# Patient Record
Sex: MALE | Race: OTHER | HISPANIC OR LATINO
[De-identification: names, ages, dates, MRNs, and addresses within clinical notes are randomized per-mention and may not be internally consistent; named-entity substitution may affect disease eponyms.]

---

## 2018-05-27 PROBLEM — Z00.00 ENCOUNTER FOR PREVENTIVE HEALTH EXAMINATION: Status: ACTIVE | Noted: 2018-05-27

## 2018-06-26 ENCOUNTER — APPOINTMENT (OUTPATIENT)
Dept: BARIATRICS | Facility: CLINIC | Age: 28
End: 2018-06-26
Payer: MEDICAID

## 2018-06-26 ENCOUNTER — OTHER (OUTPATIENT)
Age: 28
End: 2018-06-26

## 2018-06-26 VITALS
BODY MASS INDEX: 40.6 KG/M2 | SYSTOLIC BLOOD PRESSURE: 126 MMHG | HEIGHT: 66 IN | WEIGHT: 252.65 LBS | OXYGEN SATURATION: 97 % | HEART RATE: 106 BPM | DIASTOLIC BLOOD PRESSURE: 78 MMHG

## 2018-06-26 DIAGNOSIS — Z78.9 OTHER SPECIFIED HEALTH STATUS: ICD-10-CM

## 2018-06-26 DIAGNOSIS — Z87.39 PERSONAL HISTORY OF OTHER DISEASES OF THE MUSCULOSKELETAL SYSTEM AND CONNECTIVE TISSUE: ICD-10-CM

## 2018-06-26 DIAGNOSIS — Z83.3 FAMILY HISTORY OF DIABETES MELLITUS: ICD-10-CM

## 2018-06-26 DIAGNOSIS — Z01.818 ENCOUNTER FOR OTHER PREPROCEDURAL EXAMINATION: ICD-10-CM

## 2018-06-26 DIAGNOSIS — R63.5 ABNORMAL WEIGHT GAIN: ICD-10-CM

## 2018-06-26 DIAGNOSIS — M54.9 DORSALGIA, UNSPECIFIED: ICD-10-CM

## 2018-06-26 DIAGNOSIS — Z82.49 FAMILY HISTORY OF ISCHEMIC HEART DISEASE AND OTHER DISEASES OF THE CIRCULATORY SYSTEM: ICD-10-CM

## 2018-06-26 PROCEDURE — 99205 OFFICE O/P NEW HI 60 MIN: CPT

## 2018-07-11 PROBLEM — Z78.9 SOCIAL ALCOHOL USE: Status: ACTIVE | Noted: 2018-06-26

## 2018-07-11 PROBLEM — Z87.39 HISTORY OF SWELLING OF FEET: Status: ACTIVE | Noted: 2018-06-26

## 2018-07-11 PROBLEM — R63.5 WEIGHT GAIN: Status: ACTIVE | Noted: 2018-06-26

## 2018-07-30 ENCOUNTER — FORM ENCOUNTER (OUTPATIENT)
Age: 28
End: 2018-07-30

## 2018-07-31 ENCOUNTER — APPOINTMENT (OUTPATIENT)
Dept: PULMONOLOGY | Facility: CLINIC | Age: 28
End: 2018-07-31
Payer: MEDICAID

## 2018-07-31 ENCOUNTER — OUTPATIENT (OUTPATIENT)
Dept: OUTPATIENT SERVICES | Facility: HOSPITAL | Age: 28
LOS: 1 days | End: 2018-07-31
Payer: MEDICAID

## 2018-07-31 ENCOUNTER — APPOINTMENT (OUTPATIENT)
Dept: RADIOLOGY | Facility: CLINIC | Age: 28
End: 2018-07-31
Payer: MEDICAID

## 2018-07-31 VITALS — WEIGHT: 267 LBS | BODY MASS INDEX: 43.95 KG/M2 | HEIGHT: 65.5 IN

## 2018-07-31 DIAGNOSIS — R06.09 OTHER FORMS OF DYSPNEA: ICD-10-CM

## 2018-07-31 DIAGNOSIS — Z01.811 ENCOUNTER FOR PREPROCEDURAL RESPIRATORY EXAMINATION: ICD-10-CM

## 2018-07-31 DIAGNOSIS — G47.33 OBSTRUCTIVE SLEEP APNEA (ADULT) (PEDIATRIC): ICD-10-CM

## 2018-07-31 PROCEDURE — 94729 DIFFUSING CAPACITY: CPT

## 2018-07-31 PROCEDURE — 71046 X-RAY EXAM CHEST 2 VIEWS: CPT

## 2018-07-31 PROCEDURE — 99204 OFFICE O/P NEW MOD 45 MIN: CPT | Mod: 25

## 2018-07-31 PROCEDURE — 71046 X-RAY EXAM CHEST 2 VIEWS: CPT | Mod: 26

## 2018-07-31 PROCEDURE — 94010 BREATHING CAPACITY TEST: CPT

## 2018-07-31 PROCEDURE — 94727 GAS DIL/WSHOT DETER LNG VOL: CPT

## 2018-07-31 PROCEDURE — 85018 HEMOGLOBIN: CPT | Mod: QW

## 2018-08-06 ENCOUNTER — APPOINTMENT (OUTPATIENT)
Dept: CARDIOLOGY | Facility: CLINIC | Age: 28
End: 2018-08-06
Payer: MEDICAID

## 2018-08-06 ENCOUNTER — APPOINTMENT (OUTPATIENT)
Dept: GASTROENTEROLOGY | Facility: CLINIC | Age: 28
End: 2018-08-06
Payer: MEDICAID

## 2018-08-06 ENCOUNTER — NON-APPOINTMENT (OUTPATIENT)
Age: 28
End: 2018-08-06

## 2018-08-06 VITALS
HEIGHT: 65.5 IN | DIASTOLIC BLOOD PRESSURE: 74 MMHG | HEART RATE: 100 BPM | BODY MASS INDEX: 41.48 KG/M2 | WEIGHT: 252 LBS | OXYGEN SATURATION: 96 % | SYSTOLIC BLOOD PRESSURE: 118 MMHG

## 2018-08-06 VITALS
SYSTOLIC BLOOD PRESSURE: 128 MMHG | HEART RATE: 101 BPM | DIASTOLIC BLOOD PRESSURE: 90 MMHG | RESPIRATION RATE: 15 BRPM | BODY MASS INDEX: 41.48 KG/M2 | WEIGHT: 252 LBS | HEIGHT: 65.5 IN

## 2018-08-06 DIAGNOSIS — K21.9 GASTRO-ESOPHAGEAL REFLUX DISEASE W/OUT ESOPHAGITIS: ICD-10-CM

## 2018-08-06 PROCEDURE — 93000 ELECTROCARDIOGRAM COMPLETE: CPT

## 2018-08-06 PROCEDURE — 82272 OCCULT BLD FECES 1-3 TESTS: CPT

## 2018-08-06 PROCEDURE — 99204 OFFICE O/P NEW MOD 45 MIN: CPT

## 2018-08-14 ENCOUNTER — APPOINTMENT (OUTPATIENT)
Dept: CARDIOLOGY | Facility: CLINIC | Age: 28
End: 2018-08-14
Payer: MEDICAID

## 2018-08-14 PROCEDURE — 93306 TTE W/DOPPLER COMPLETE: CPT

## 2018-09-12 ENCOUNTER — APPOINTMENT (OUTPATIENT)
Dept: CARDIOLOGY | Facility: CLINIC | Age: 28
End: 2018-09-12
Payer: MEDICAID

## 2018-09-12 PROCEDURE — 93351 STRESS TTE COMPLETE: CPT

## 2018-09-12 PROCEDURE — 93320 DOPPLER ECHO COMPLETE: CPT

## 2018-09-12 PROCEDURE — 93352 ADMIN ECG CONTRAST AGENT: CPT

## 2018-09-19 ENCOUNTER — APPOINTMENT (OUTPATIENT)
Dept: BARIATRICS/WEIGHT MGMT | Facility: CLINIC | Age: 28
End: 2018-09-19
Payer: SELF-PAY

## 2018-09-19 ENCOUNTER — APPOINTMENT (OUTPATIENT)
Dept: BARIATRICS | Facility: CLINIC | Age: 28
End: 2018-09-19
Payer: MEDICAID

## 2018-09-19 VITALS
HEART RATE: 118 BPM | DIASTOLIC BLOOD PRESSURE: 79 MMHG | BODY MASS INDEX: 41.32 KG/M2 | HEIGHT: 65.5 IN | WEIGHT: 251 LBS | SYSTOLIC BLOOD PRESSURE: 120 MMHG | OXYGEN SATURATION: 96 %

## 2018-09-19 PROCEDURE — 99214 OFFICE O/P EST MOD 30 MIN: CPT

## 2018-09-19 PROCEDURE — 97802 MEDICAL NUTRITION INDIV IN: CPT

## 2018-09-20 ENCOUNTER — OUTPATIENT (OUTPATIENT)
Dept: OUTPATIENT SERVICES | Facility: HOSPITAL | Age: 28
LOS: 1 days | End: 2018-09-20
Payer: MEDICAID

## 2018-09-20 DIAGNOSIS — G47.33 OBSTRUCTIVE SLEEP APNEA (ADULT) (PEDIATRIC): ICD-10-CM

## 2018-09-20 PROCEDURE — 95810 POLYSOM 6/> YRS 4/> PARAM: CPT | Mod: 26

## 2018-09-20 PROCEDURE — 95810 POLYSOM 6/> YRS 4/> PARAM: CPT

## 2018-10-15 ENCOUNTER — APPOINTMENT (OUTPATIENT)
Dept: PULMONOLOGY | Facility: CLINIC | Age: 28
End: 2018-10-15
Payer: MEDICAID

## 2018-10-15 ENCOUNTER — APPOINTMENT (OUTPATIENT)
Dept: BARIATRICS/WEIGHT MGMT | Facility: CLINIC | Age: 28
End: 2018-10-15
Payer: MEDICAID

## 2018-10-15 ENCOUNTER — RESULT REVIEW (OUTPATIENT)
Age: 28
End: 2018-10-15

## 2018-10-15 ENCOUNTER — APPOINTMENT (OUTPATIENT)
Dept: GASTROENTEROLOGY | Facility: GI CENTER | Age: 28
End: 2018-10-15
Payer: MEDICAID

## 2018-10-15 ENCOUNTER — OUTPATIENT (OUTPATIENT)
Dept: OUTPATIENT SERVICES | Facility: HOSPITAL | Age: 28
LOS: 1 days | End: 2018-10-15
Payer: MEDICAID

## 2018-10-15 VITALS — HEART RATE: 97 BPM | OXYGEN SATURATION: 96 %

## 2018-10-15 VITALS — HEIGHT: 65.5 IN | WEIGHT: 251 LBS | BODY MASS INDEX: 41.32 KG/M2

## 2018-10-15 VITALS — WEIGHT: 245 LBS | BODY MASS INDEX: 40.15 KG/M2

## 2018-10-15 DIAGNOSIS — Z78.9 OTHER SPECIFIED HEALTH STATUS: ICD-10-CM

## 2018-10-15 DIAGNOSIS — Z01.811 ENCOUNTER FOR PREPROCEDURAL RESPIRATORY EXAMINATION: ICD-10-CM

## 2018-10-15 DIAGNOSIS — K21.9 GASTRO-ESOPHAGEAL REFLUX DISEASE WITHOUT ESOPHAGITIS: ICD-10-CM

## 2018-10-15 PROCEDURE — 43239 EGD BIOPSY SINGLE/MULTIPLE: CPT

## 2018-10-15 PROCEDURE — 88342 IMHCHEM/IMCYTCHM 1ST ANTB: CPT | Mod: 26

## 2018-10-15 PROCEDURE — 88305 TISSUE EXAM BY PATHOLOGIST: CPT | Mod: 26

## 2018-10-15 PROCEDURE — 90791 PSYCH DIAGNOSTIC EVALUATION: CPT

## 2018-10-15 PROCEDURE — 99214 OFFICE O/P EST MOD 30 MIN: CPT

## 2018-10-15 PROCEDURE — 90785 PSYTX COMPLEX INTERACTIVE: CPT

## 2018-10-15 PROCEDURE — 88305 TISSUE EXAM BY PATHOLOGIST: CPT

## 2018-10-15 PROCEDURE — 88342 IMHCHEM/IMCYTCHM 1ST ANTB: CPT

## 2018-10-15 RX ORDER — IBUPROFEN 800 MG/1
800 TABLET, FILM COATED ORAL
Refills: 0 | Status: DISCONTINUED | COMMUNITY
End: 2018-10-15

## 2018-10-18 LAB — SURGICAL PATHOLOGY FINAL REPORT - CH: SIGNIFICANT CHANGE UP

## 2018-11-13 ENCOUNTER — APPOINTMENT (OUTPATIENT)
Dept: BARIATRICS | Facility: CLINIC | Age: 28
End: 2018-11-13

## 2018-12-06 ENCOUNTER — APPOINTMENT (OUTPATIENT)
Dept: BARIATRICS | Facility: CLINIC | Age: 28
End: 2018-12-06
Payer: MEDICAID

## 2018-12-06 VITALS
SYSTOLIC BLOOD PRESSURE: 120 MMHG | HEART RATE: 95 BPM | DIASTOLIC BLOOD PRESSURE: 80 MMHG | BODY MASS INDEX: 40.99 KG/M2 | WEIGHT: 249 LBS | HEIGHT: 65.5 IN | OXYGEN SATURATION: 98 %

## 2018-12-06 DIAGNOSIS — G47.33 OBSTRUCTIVE SLEEP APNEA (ADULT) (PEDIATRIC): ICD-10-CM

## 2018-12-06 PROCEDURE — 99214 OFFICE O/P EST MOD 30 MIN: CPT

## 2018-12-09 ENCOUNTER — FORM ENCOUNTER (OUTPATIENT)
Age: 28
End: 2018-12-09

## 2018-12-10 ENCOUNTER — OUTPATIENT (OUTPATIENT)
Dept: OUTPATIENT SERVICES | Facility: HOSPITAL | Age: 28
LOS: 1 days | End: 2018-12-10
Payer: MEDICAID

## 2018-12-10 ENCOUNTER — APPOINTMENT (OUTPATIENT)
Dept: ULTRASOUND IMAGING | Facility: CLINIC | Age: 28
End: 2018-12-10
Payer: MEDICAID

## 2018-12-10 DIAGNOSIS — M54.9 DORSALGIA, UNSPECIFIED: ICD-10-CM

## 2018-12-10 DIAGNOSIS — E78.00 PURE HYPERCHOLESTEROLEMIA, UNSPECIFIED: ICD-10-CM

## 2018-12-10 DIAGNOSIS — Z87.39 PERSONAL HISTORY OF OTHER DISEASES OF THE MUSCULOSKELETAL SYSTEM AND CONNECTIVE TISSUE: ICD-10-CM

## 2018-12-10 DIAGNOSIS — E66.01 MORBID (SEVERE) OBESITY DUE TO EXCESS CALORIES: ICD-10-CM

## 2018-12-10 PROCEDURE — 93970 EXTREMITY STUDY: CPT | Mod: 26

## 2018-12-10 PROCEDURE — 93970 EXTREMITY STUDY: CPT

## 2018-12-10 PROCEDURE — 76700 US EXAM ABDOM COMPLETE: CPT | Mod: 26

## 2018-12-10 PROCEDURE — 76700 US EXAM ABDOM COMPLETE: CPT

## 2019-01-25 ENCOUNTER — APPOINTMENT (OUTPATIENT)
Dept: CARDIOLOGY | Facility: CLINIC | Age: 29
End: 2019-01-25
Payer: MEDICAID

## 2019-01-25 ENCOUNTER — NON-APPOINTMENT (OUTPATIENT)
Age: 29
End: 2019-01-25

## 2019-01-25 VITALS
SYSTOLIC BLOOD PRESSURE: 115 MMHG | HEART RATE: 100 BPM | HEIGHT: 65 IN | DIASTOLIC BLOOD PRESSURE: 76 MMHG | BODY MASS INDEX: 40.48 KG/M2 | WEIGHT: 243 LBS | OXYGEN SATURATION: 96 %

## 2019-01-25 DIAGNOSIS — Z01.810 ENCOUNTER FOR PREPROCEDURAL CARDIOVASCULAR EXAMINATION: ICD-10-CM

## 2019-01-25 DIAGNOSIS — E78.00 PURE HYPERCHOLESTEROLEMIA, UNSPECIFIED: ICD-10-CM

## 2019-01-25 DIAGNOSIS — R94.31 ABNORMAL ELECTROCARDIOGRAM [ECG] [EKG]: ICD-10-CM

## 2019-01-25 DIAGNOSIS — E66.01 MORBID (SEVERE) OBESITY DUE TO EXCESS CALORIES: ICD-10-CM

## 2019-01-25 PROCEDURE — 93000 ELECTROCARDIOGRAM COMPLETE: CPT

## 2019-01-25 PROCEDURE — 99214 OFFICE O/P EST MOD 30 MIN: CPT

## 2019-02-12 ENCOUNTER — RESULT REVIEW (OUTPATIENT)
Age: 29
End: 2019-02-12

## 2019-02-12 ENCOUNTER — MEDICATION RENEWAL (OUTPATIENT)
Age: 29
End: 2019-02-12

## 2019-02-12 DIAGNOSIS — A04.8 OTHER SPECIFIED BACTERIAL INTESTINAL INFECTIONS: ICD-10-CM

## 2019-02-12 DIAGNOSIS — B96.81 GASTRITIS, UNSPECIFIED, W/OUT BLEEDING: ICD-10-CM

## 2019-02-12 DIAGNOSIS — K76.0 FATTY (CHANGE OF) LIVER, NOT ELSEWHERE CLASSIFIED: ICD-10-CM

## 2019-02-12 DIAGNOSIS — K29.70 GASTRITIS, UNSPECIFIED, W/OUT BLEEDING: ICD-10-CM

## 2019-02-12 RX ORDER — OMEPRAZOLE 20 MG/1
20 CAPSULE, DELAYED RELEASE ORAL TWICE DAILY
Qty: 20 | Refills: 0 | Status: ACTIVE | COMMUNITY
Start: 2019-02-12 | End: 1900-01-01

## 2019-02-12 RX ORDER — METRONIDAZOLE 500 MG/1
500 TABLET ORAL TWICE DAILY
Qty: 10 | Refills: 0 | Status: ACTIVE | COMMUNITY
Start: 2019-02-12 | End: 1900-01-01

## 2019-02-12 RX ORDER — CLARITHROMYCIN 500 MG/1
500 TABLET, FILM COATED ORAL TWICE DAILY
Qty: 10 | Refills: 0 | Status: ACTIVE | COMMUNITY
Start: 2019-02-12 | End: 1900-01-01

## 2019-02-12 RX ORDER — AMOXICILLIN 500 MG/1
500 TABLET, FILM COATED ORAL
Qty: 20 | Refills: 0 | Status: ACTIVE | COMMUNITY
Start: 2019-02-12 | End: 1900-01-01

## 2019-04-30 ENCOUNTER — APPOINTMENT (OUTPATIENT)
Dept: BARIATRICS/WEIGHT MGMT | Facility: CLINIC | Age: 29
End: 2019-04-30

## 2019-05-10 ENCOUNTER — OTHER (OUTPATIENT)
Age: 29
End: 2019-05-10

## 2019-05-28 ENCOUNTER — APPOINTMENT (OUTPATIENT)
Dept: PULMONOLOGY | Facility: CLINIC | Age: 29
End: 2019-05-28

## 2019-08-07 ENCOUNTER — APPOINTMENT (OUTPATIENT)
Dept: CARDIOLOGY | Facility: CLINIC | Age: 29
End: 2019-08-07

## 2020-09-29 ENCOUNTER — APPOINTMENT (OUTPATIENT)
Dept: PULMONOLOGY | Facility: CLINIC | Age: 30
End: 2020-09-29

## 2020-10-07 ENCOUNTER — OUTPATIENT (OUTPATIENT)
Dept: OUTPATIENT SERVICES | Facility: HOSPITAL | Age: 30
LOS: 1 days | End: 2020-10-07
Payer: MEDICAID

## 2020-10-07 ENCOUNTER — RESULT REVIEW (OUTPATIENT)
Age: 30
End: 2020-10-07

## 2020-10-07 VITALS
DIASTOLIC BLOOD PRESSURE: 75 MMHG | RESPIRATION RATE: 18 BRPM | SYSTOLIC BLOOD PRESSURE: 119 MMHG | OXYGEN SATURATION: 100 % | HEIGHT: 66 IN | WEIGHT: 270.07 LBS | HEART RATE: 93 BPM | TEMPERATURE: 98 F

## 2020-10-07 DIAGNOSIS — Z29.9 ENCOUNTER FOR PROPHYLACTIC MEASURES, UNSPECIFIED: ICD-10-CM

## 2020-10-07 DIAGNOSIS — Z01.818 ENCOUNTER FOR OTHER PREPROCEDURAL EXAMINATION: ICD-10-CM

## 2020-10-07 DIAGNOSIS — E66.01 MORBID (SEVERE) OBESITY DUE TO EXCESS CALORIES: ICD-10-CM

## 2020-10-07 DIAGNOSIS — Z98.890 OTHER SPECIFIED POSTPROCEDURAL STATES: Chronic | ICD-10-CM

## 2020-10-07 LAB
ALBUMIN SERPL ELPH-MCNC: 3.8 G/DL — SIGNIFICANT CHANGE UP (ref 3.3–5)
ANION GAP SERPL CALC-SCNC: 4 MMOL/L — LOW (ref 5–17)
APPEARANCE UR: CLEAR — SIGNIFICANT CHANGE UP
APTT BLD: 36.9 SEC — HIGH (ref 27.5–35.5)
BASOPHILS # BLD AUTO: 0.05 K/UL — SIGNIFICANT CHANGE UP (ref 0–0.2)
BASOPHILS NFR BLD AUTO: 0.8 % — SIGNIFICANT CHANGE UP (ref 0–2)
BILIRUB UR-MCNC: NEGATIVE — SIGNIFICANT CHANGE UP
BLOOD GAS SOURCE: SIGNIFICANT CHANGE UP
BUN SERPL-MCNC: 12 MG/DL — SIGNIFICANT CHANGE UP (ref 7–23)
CALCIUM SERPL-MCNC: 9 MG/DL — SIGNIFICANT CHANGE UP (ref 8.5–10.1)
CHLORIDE SERPL-SCNC: 107 MMOL/L — SIGNIFICANT CHANGE UP (ref 96–108)
CO2 SERPL-SCNC: 27 MMOL/L — SIGNIFICANT CHANGE UP (ref 22–31)
COHGB MFR BLDV: 1.7 % — HIGH (ref 0–1.5)
COLOR SPEC: YELLOW — SIGNIFICANT CHANGE UP
CREAT SERPL-MCNC: 1.07 MG/DL — SIGNIFICANT CHANGE UP (ref 0.5–1.3)
DIFF PNL FLD: NEGATIVE — SIGNIFICANT CHANGE UP
EOSINOPHIL # BLD AUTO: 0.34 K/UL — SIGNIFICANT CHANGE UP (ref 0–0.5)
EOSINOPHIL NFR BLD AUTO: 5.8 % — SIGNIFICANT CHANGE UP (ref 0–6)
GLUCOSE SERPL-MCNC: 97 MG/DL — SIGNIFICANT CHANGE UP (ref 70–99)
GLUCOSE UR QL: NEGATIVE MG/DL — SIGNIFICANT CHANGE UP
HCT VFR BLD CALC: 44 % — SIGNIFICANT CHANGE UP (ref 39–50)
HGB BLD-MCNC: 14.9 G/DL — SIGNIFICANT CHANGE UP (ref 13–17)
IMM GRANULOCYTES NFR BLD AUTO: 0.3 % — SIGNIFICANT CHANGE UP (ref 0–1.5)
INR BLD: 1.02 RATIO — SIGNIFICANT CHANGE UP (ref 0.88–1.16)
KETONES UR-MCNC: NEGATIVE — SIGNIFICANT CHANGE UP
LEUKOCYTE ESTERASE UR-ACNC: NEGATIVE — SIGNIFICANT CHANGE UP
LYMPHOCYTES # BLD AUTO: 1.99 K/UL — SIGNIFICANT CHANGE UP (ref 1–3.3)
LYMPHOCYTES # BLD AUTO: 33.8 % — SIGNIFICANT CHANGE UP (ref 13–44)
MCHC RBC-ENTMCNC: 30.2 PG — SIGNIFICANT CHANGE UP (ref 27–34)
MCHC RBC-ENTMCNC: 33.9 GM/DL — SIGNIFICANT CHANGE UP (ref 32–36)
MCV RBC AUTO: 89.1 FL — SIGNIFICANT CHANGE UP (ref 80–100)
MONOCYTES # BLD AUTO: 0.54 K/UL — SIGNIFICANT CHANGE UP (ref 0–0.9)
MONOCYTES NFR BLD AUTO: 9.2 % — SIGNIFICANT CHANGE UP (ref 2–14)
NEUTROPHILS # BLD AUTO: 2.95 K/UL — SIGNIFICANT CHANGE UP (ref 1.8–7.4)
NEUTROPHILS NFR BLD AUTO: 50.1 % — SIGNIFICANT CHANGE UP (ref 43–77)
NITRITE UR-MCNC: NEGATIVE — SIGNIFICANT CHANGE UP
PH UR: 5 — SIGNIFICANT CHANGE UP (ref 5–8)
PLATELET # BLD AUTO: 271 K/UL — SIGNIFICANT CHANGE UP (ref 150–400)
POTASSIUM SERPL-MCNC: 4.1 MMOL/L — SIGNIFICANT CHANGE UP (ref 3.5–5.3)
POTASSIUM SERPL-SCNC: 4.1 MMOL/L — SIGNIFICANT CHANGE UP (ref 3.5–5.3)
PROT UR-MCNC: NEGATIVE MG/DL — SIGNIFICANT CHANGE UP
PROTHROM AB SERPL-ACNC: 11.8 SEC — SIGNIFICANT CHANGE UP (ref 10.6–13.6)
RBC # BLD: 4.94 M/UL — SIGNIFICANT CHANGE UP (ref 4.2–5.8)
RBC # FLD: 12.2 % — SIGNIFICANT CHANGE UP (ref 10.3–14.5)
SODIUM SERPL-SCNC: 138 MMOL/L — SIGNIFICANT CHANGE UP (ref 135–145)
SP GR SPEC: 1.02 — SIGNIFICANT CHANGE UP (ref 1.01–1.02)
UROBILINOGEN FLD QL: NEGATIVE MG/DL — SIGNIFICANT CHANGE UP
WBC # BLD: 5.89 K/UL — SIGNIFICANT CHANGE UP (ref 3.8–10.5)
WBC # FLD AUTO: 5.89 K/UL — SIGNIFICANT CHANGE UP (ref 3.8–10.5)

## 2020-10-07 PROCEDURE — 85610 PROTHROMBIN TIME: CPT

## 2020-10-07 PROCEDURE — 80048 BASIC METABOLIC PNL TOTAL CA: CPT

## 2020-10-07 PROCEDURE — 86901 BLOOD TYPING SEROLOGIC RH(D): CPT

## 2020-10-07 PROCEDURE — 71046 X-RAY EXAM CHEST 2 VIEWS: CPT

## 2020-10-07 PROCEDURE — 86850 RBC ANTIBODY SCREEN: CPT

## 2020-10-07 PROCEDURE — G0463: CPT | Mod: 25

## 2020-10-07 PROCEDURE — 81003 URINALYSIS AUTO W/O SCOPE: CPT

## 2020-10-07 PROCEDURE — 71046 X-RAY EXAM CHEST 2 VIEWS: CPT | Mod: 26

## 2020-10-07 PROCEDURE — 82375 ASSAY CARBOXYHB QUANT: CPT

## 2020-10-07 PROCEDURE — 85730 THROMBOPLASTIN TIME PARTIAL: CPT

## 2020-10-07 PROCEDURE — 36415 COLL VENOUS BLD VENIPUNCTURE: CPT

## 2020-10-07 PROCEDURE — 86900 BLOOD TYPING SEROLOGIC ABO: CPT

## 2020-10-07 PROCEDURE — 82040 ASSAY OF SERUM ALBUMIN: CPT

## 2020-10-07 PROCEDURE — 85025 COMPLETE CBC W/AUTO DIFF WBC: CPT

## 2020-10-07 PROCEDURE — 93005 ELECTROCARDIOGRAM TRACING: CPT

## 2020-10-07 PROCEDURE — 93010 ELECTROCARDIOGRAM REPORT: CPT

## 2020-10-07 NOTE — H&P PST ADULT - HISTORY OF PRESENT ILLNESS
29 y.o morbidly obese male presents for PST with hx of weight loss struggles for the past 5yrs. He has tried multiple modalities without success. Patient has followed with bariatric surgeon, evaluated and is candidate for bariatric surgery. Patient opting for surgical intervention. Scheduled for Laparoscopic Sleeve Gastrectomy, Intraoperative Endoscopy

## 2020-10-07 NOTE — H&P PST ADULT - ASSESSMENT
29 y.o male scheduled for  Laparoscopic Sleeve Gastrectomy, Intraoperative Endoscopy   Plan  1. Stop all NSAIDS, herbal supplements and vitamins for 7 days.  2. NPO at midnight.  3. Take the following medications--none-- with small sips of water on the morning of your procedure/surgery.  4. Use EZ sponges as directed  5. Labs, EKG, CXR as per surgeon  6. PMD Dr Mariel Gomez & cardiology [facility] for optimization prior to surgery as per surgeon       29 y.o male scheduled for  Laparoscopic Sleeve Gastrectomy, Intraoperative Endoscopy   Plan  1. Stop all NSAIDS, herbal supplements and vitamins for 7 days.  2. NPO at midnight.  3. Take the following medications--none-- with small sips of water on the morning of your procedure/surgery.  4. Use EZ sponges as directed  5. Labs, EKG, CXR as per surgeon  6. PMD Dr Mariel Gomez & cardiology [facility] for optimization prior to surgery as per surgeon  7. COVID appt: 10/11/2020    Denies travel outside of state or country in the last 14 days.   Denies contact with known Coronavirus positive person.  Denies fever, chills, cough, congestion, runny nose, SOB or difficulty breathing, fatigue, muscle or body ache, headache. loss of taste or smell, N/V/D.    CAPRINI SCORE    AGE RELATED RISK FACTORS                                                       MOBILITY RELATED FACTORS  [ ] Age 41-60 years                                            (1 Point)                  [ ] Bed rest                                                        (1 Point)  [ ] Age: 61-74 years                                           (2 Points)                [ ] Plaster cast                                                   (2 Points)  [ ] Age= 75 years                                              (3 Points)                 [ ] Bed bound for more than 72 hours                   (2 Points)    DISEASE RELATED RISK FACTORS                                               GENDER SPECIFIC FACTORS  [ ] Edema in the lower extremities                       (1 Point)                  [ ] Pregnancy                                                     (1 Point)  [ ] Varicose veins                                               (1 Point)                  [ ] Post-partum < 6 weeks                                   (1 Point)             [x ] BMI > 25 Kg/m2                                            (1 Point)                  [ ] Hormonal therapy  or oral contraception            (1 Point)                 [ ] Sepsis (in the previous month)                        (1 Point)                  [ ] History of pregnancy complications  [ ] Pneumonia or serious lung disease                                               [ ] Unexplained or recurrent                       (1 Point)           (in the previous month)                               (1 Point)  [ ] Abnormal pulmonary function test                     (1 Point)                 SURGERY RELATED RISK FACTORS  [ ] Acute myocardial infarction                              (1 Point)                 [ ]  Section                                            (1 Point)  [ ] Congestive heart failure (in the previous month)  (1 Point)                 [ ] Minor surgery                                                 (1 Point)   [ ] Inflammatory bowel disease                             (1 Point)                 [ ] Arthroscopic surgery                                        (2 Points)  [ ] Central venous access                                    (2 Points)                [ x] General surgery lasting more than 45 minutes   (2 Points)       [ ] Stroke (in the previous month)                          (5 Points)               [ ] Elective arthroplasty                                        (5 Points)                                                                                                                                               HEMATOLOGY RELATED FACTORS                                                 TRAUMA RELATED RISK FACTORS  [ ] Prior episodes of VTE                                     (3 Points)                 [ ] Fracture of the hip, pelvis, or leg                       (5 Points)  [ ] Positive family history for VTE                         (3 Points)                 [ ] Acute spinal cord injury (in the previous month)  (5 Points)  [ ] Prothrombin 56934 A                                      (3 Points)                 [ ] Paralysis  (less than 1 month)                          (5 Points)  [ ] Factor V Leiden                                             (3 Points)                 [ ] Multiple Trauma within 1 month                         (5 Points)  [ ] Lupus anticoagulants                                     (3 Points)                                                           [ ] Anticardiolipin antibodies                                (3 Points)                                                       [ ] High homocysteine in the blood                      (3 Points)                                             [ ] Other congenital or acquired thrombophilia       (3 Points)                                                [ ] Heparin induced thrombocytopenia                  (3 Points)                                          Total Score [      3    ]    The Caprini score indicates this patient is at risk for a VTE event (score 3-5).  Most surgical patients in this group would benefit from pharmacologic prophylaxis.  The surgical team will determine the balance between VTE risk and bleeding risk

## 2020-10-07 NOTE — H&P PST ADULT - TEACHING/LEARNING LEARNING PREFERENCES
pictorial/skill demonstration/video/audio/individual instruction/computer/internet/verbal instruction/group instruction/written material

## 2020-10-08 DIAGNOSIS — E66.01 MORBID (SEVERE) OBESITY DUE TO EXCESS CALORIES: ICD-10-CM

## 2020-10-08 DIAGNOSIS — Z01.818 ENCOUNTER FOR OTHER PREPROCEDURAL EXAMINATION: ICD-10-CM

## 2020-10-11 ENCOUNTER — OUTPATIENT (OUTPATIENT)
Dept: OUTPATIENT SERVICES | Facility: HOSPITAL | Age: 30
LOS: 1 days | End: 2020-10-11
Payer: MEDICAID

## 2020-10-11 DIAGNOSIS — Z98.890 OTHER SPECIFIED POSTPROCEDURAL STATES: Chronic | ICD-10-CM

## 2020-10-11 DIAGNOSIS — Z11.59 ENCOUNTER FOR SCREENING FOR OTHER VIRAL DISEASES: ICD-10-CM

## 2020-10-11 PROCEDURE — U0003: CPT

## 2020-10-12 DIAGNOSIS — Z11.59 ENCOUNTER FOR SCREENING FOR OTHER VIRAL DISEASES: ICD-10-CM

## 2020-10-12 LAB — SARS-COV-2 RNA SPEC QL NAA+PROBE: SIGNIFICANT CHANGE UP

## 2020-10-13 RX ORDER — APREPITANT 80 MG/1
80 CAPSULE ORAL ONCE
Refills: 0 | Status: DISCONTINUED | OUTPATIENT
Start: 2020-10-14 | End: 2020-10-15

## 2020-10-13 RX ORDER — HYDROMORPHONE HYDROCHLORIDE 2 MG/ML
0.5 INJECTION INTRAMUSCULAR; INTRAVENOUS; SUBCUTANEOUS
Refills: 0 | Status: DISCONTINUED | OUTPATIENT
Start: 2020-10-14 | End: 2020-10-14

## 2020-10-13 RX ORDER — ONDANSETRON 8 MG/1
4 TABLET, FILM COATED ORAL ONCE
Refills: 0 | Status: DISCONTINUED | OUTPATIENT
Start: 2020-10-14 | End: 2020-10-14

## 2020-10-13 RX ORDER — HEPARIN SODIUM 5000 [USP'U]/ML
5000 INJECTION INTRAVENOUS; SUBCUTANEOUS ONCE
Refills: 0 | Status: DISCONTINUED | OUTPATIENT
Start: 2020-10-14 | End: 2020-10-15

## 2020-10-13 RX ORDER — SODIUM CHLORIDE 9 MG/ML
1000 INJECTION, SOLUTION INTRAVENOUS
Refills: 0 | Status: DISCONTINUED | OUTPATIENT
Start: 2020-10-14 | End: 2020-10-14

## 2020-10-14 ENCOUNTER — RESULT REVIEW (OUTPATIENT)
Age: 30
End: 2020-10-14

## 2020-10-14 ENCOUNTER — INPATIENT (INPATIENT)
Facility: HOSPITAL | Age: 30
LOS: 0 days | Discharge: HOME | DRG: 403 | End: 2020-10-15
Attending: SURGERY | Admitting: SURGERY
Payer: MEDICAID

## 2020-10-14 VITALS
DIASTOLIC BLOOD PRESSURE: 68 MMHG | RESPIRATION RATE: 18 BRPM | TEMPERATURE: 98 F | SYSTOLIC BLOOD PRESSURE: 129 MMHG | OXYGEN SATURATION: 98 % | HEART RATE: 74 BPM | HEIGHT: 66 IN | WEIGHT: 261.91 LBS

## 2020-10-14 DIAGNOSIS — E66.01 MORBID (SEVERE) OBESITY DUE TO EXCESS CALORIES: ICD-10-CM

## 2020-10-14 DIAGNOSIS — Z98.890 OTHER SPECIFIED POSTPROCEDURAL STATES: Chronic | ICD-10-CM

## 2020-10-14 PROCEDURE — 88307 TISSUE EXAM BY PATHOLOGIST: CPT

## 2020-10-14 PROCEDURE — 99232 SBSQ HOSP IP/OBS MODERATE 35: CPT

## 2020-10-14 PROCEDURE — 88307 TISSUE EXAM BY PATHOLOGIST: CPT | Mod: 26

## 2020-10-14 PROCEDURE — 85025 COMPLETE CBC W/AUTO DIFF WBC: CPT

## 2020-10-14 PROCEDURE — C9290: CPT

## 2020-10-14 PROCEDURE — 43775 LAP SLEEVE GASTRECTOMY: CPT | Mod: AS

## 2020-10-14 PROCEDURE — 80048 BASIC METABOLIC PNL TOTAL CA: CPT

## 2020-10-14 PROCEDURE — C1889: CPT

## 2020-10-14 PROCEDURE — 36415 COLL VENOUS BLD VENIPUNCTURE: CPT

## 2020-10-14 PROCEDURE — C9113: CPT

## 2020-10-14 PROCEDURE — 82962 GLUCOSE BLOOD TEST: CPT

## 2020-10-14 RX ORDER — GLUCAGON INJECTION, SOLUTION 0.5 MG/.1ML
1 INJECTION, SOLUTION SUBCUTANEOUS ONCE
Refills: 0 | Status: DISCONTINUED | OUTPATIENT
Start: 2020-10-14 | End: 2020-10-15

## 2020-10-14 RX ORDER — DEXTROSE 50 % IN WATER 50 %
25 SYRINGE (ML) INTRAVENOUS ONCE
Refills: 0 | Status: DISCONTINUED | OUTPATIENT
Start: 2020-10-14 | End: 2020-10-15

## 2020-10-14 RX ORDER — SODIUM CHLORIDE 9 MG/ML
1000 INJECTION, SOLUTION INTRAVENOUS
Refills: 0 | Status: DISCONTINUED | OUTPATIENT
Start: 2020-10-14 | End: 2020-10-15

## 2020-10-14 RX ORDER — OXYCODONE HYDROCHLORIDE 5 MG/1
10 TABLET ORAL EVERY 4 HOURS
Refills: 0 | Status: DISCONTINUED | OUTPATIENT
Start: 2020-10-14 | End: 2020-10-15

## 2020-10-14 RX ORDER — KETOROLAC TROMETHAMINE 30 MG/ML
30 SYRINGE (ML) INJECTION EVERY 6 HOURS
Refills: 0 | Status: DISCONTINUED | OUTPATIENT
Start: 2020-10-14 | End: 2020-10-15

## 2020-10-14 RX ORDER — ONDANSETRON 8 MG/1
4 TABLET, FILM COATED ORAL EVERY 6 HOURS
Refills: 0 | Status: DISCONTINUED | OUTPATIENT
Start: 2020-10-14 | End: 2020-10-15

## 2020-10-14 RX ORDER — PANTOPRAZOLE SODIUM 20 MG/1
40 TABLET, DELAYED RELEASE ORAL DAILY
Refills: 0 | Status: DISCONTINUED | OUTPATIENT
Start: 2020-10-14 | End: 2020-10-15

## 2020-10-14 RX ORDER — ENOXAPARIN SODIUM 100 MG/ML
40 INJECTION SUBCUTANEOUS EVERY 12 HOURS
Refills: 0 | Status: DISCONTINUED | OUTPATIENT
Start: 2020-10-14 | End: 2020-10-15

## 2020-10-14 RX ORDER — DEXTROSE 50 % IN WATER 50 %
12.5 SYRINGE (ML) INTRAVENOUS ONCE
Refills: 0 | Status: DISCONTINUED | OUTPATIENT
Start: 2020-10-14 | End: 2020-10-15

## 2020-10-14 RX ORDER — ACETAMINOPHEN 500 MG
1000 TABLET ORAL EVERY 6 HOURS
Refills: 0 | Status: COMPLETED | OUTPATIENT
Start: 2020-10-14 | End: 2020-10-15

## 2020-10-14 RX ORDER — DEXTROSE 50 % IN WATER 50 %
15 SYRINGE (ML) INTRAVENOUS ONCE
Refills: 0 | Status: DISCONTINUED | OUTPATIENT
Start: 2020-10-14 | End: 2020-10-15

## 2020-10-14 RX ORDER — INSULIN LISPRO 100/ML
VIAL (ML) SUBCUTANEOUS EVERY 6 HOURS
Refills: 0 | Status: DISCONTINUED | OUTPATIENT
Start: 2020-10-14 | End: 2020-10-15

## 2020-10-14 RX ORDER — OXYCODONE HYDROCHLORIDE 5 MG/1
5 TABLET ORAL EVERY 4 HOURS
Refills: 0 | Status: DISCONTINUED | OUTPATIENT
Start: 2020-10-14 | End: 2020-10-15

## 2020-10-14 RX ADMIN — HYDROMORPHONE HYDROCHLORIDE 0.5 MILLIGRAM(S): 2 INJECTION INTRAMUSCULAR; INTRAVENOUS; SUBCUTANEOUS at 13:15

## 2020-10-14 RX ADMIN — Medication 400 MILLIGRAM(S): at 15:45

## 2020-10-14 RX ADMIN — Medication 1000 MILLIGRAM(S): at 21:11

## 2020-10-14 RX ADMIN — PANTOPRAZOLE SODIUM 40 MILLIGRAM(S): 20 TABLET, DELAYED RELEASE ORAL at 13:01

## 2020-10-14 RX ADMIN — HYDROMORPHONE HYDROCHLORIDE 0.5 MILLIGRAM(S): 2 INJECTION INTRAMUSCULAR; INTRAVENOUS; SUBCUTANEOUS at 12:50

## 2020-10-14 RX ADMIN — Medication 1000 MILLIGRAM(S): at 16:07

## 2020-10-14 RX ADMIN — Medication 400 MILLIGRAM(S): at 21:09

## 2020-10-14 RX ADMIN — Medication 30 MILLIGRAM(S): at 21:09

## 2020-10-14 RX ADMIN — Medication 30 MILLIGRAM(S): at 15:45

## 2020-10-14 RX ADMIN — Medication 30 MILLIGRAM(S): at 16:07

## 2020-10-14 RX ADMIN — HYDROMORPHONE HYDROCHLORIDE 0.5 MILLIGRAM(S): 2 INJECTION INTRAMUSCULAR; INTRAVENOUS; SUBCUTANEOUS at 13:05

## 2020-10-14 RX ADMIN — HYDROMORPHONE HYDROCHLORIDE 0.5 MILLIGRAM(S): 2 INJECTION INTRAMUSCULAR; INTRAVENOUS; SUBCUTANEOUS at 13:04

## 2020-10-14 RX ADMIN — Medication 30 MILLIGRAM(S): at 21:11

## 2020-10-14 RX ADMIN — ENOXAPARIN SODIUM 40 MILLIGRAM(S): 100 INJECTION SUBCUTANEOUS at 21:58

## 2020-10-14 RX ADMIN — SODIUM CHLORIDE 150 MILLILITER(S): 9 INJECTION, SOLUTION INTRAVENOUS at 17:12

## 2020-10-14 NOTE — CONSULT NOTE ADULT - SUBJECTIVE AND OBJECTIVE BOX
PCP:    CHIEF COMPLAINT:  Morbid Obesity    HISTORY OF THE PRESENT ILLNESS: This a 31 yo male with PMH: GERD, HLD, sleep apnea and  morbid obesity who failed diet, exercise and usual modalities for weight loss and is now S/P Gastric Sleeve.  Patient is seen in PACU, in NAD, still  sedated from anesthesia. We are consulted for Medical management    PAST MEDICAL HISTORY: as above    PAST SURGICAL HISTORY: Upper endo    FAMILY HISTORY:   DM    SOCIAL HISTORY:  no smoking, social alcohol, no drugs    ALLERGIES: NKDA    HOME MEDS: prilosec    REVIEW OF SYSTEMS:   All 10 systems reviewed in detailed and found to be negative with the exception of what has already been described above    MEDICATIONS  (STANDING):  aprepitant 80 milliGRAM(s) Oral once  heparin   Injectable 5000 Unit(s) SubCutaneous once  lactated ringers. 1000 milliLiter(s) (75 mL/Hr) IV Continuous <Continuous>  pantoprazole  Injectable 40 milliGRAM(s) IV Push daily    MEDICATIONS  (PRN):  HYDROmorphone  Injectable 0.5 milliGRAM(s) IV Push every 10 minutes PRN Moderate Pain (4 - 6)  ondansetron Injectable 4 milliGRAM(s) IV Push once PRN Nausea and/or Vomiting      VITALS:  T(F): 97.5 (10-14-20 @ 08:28), Max: 97.5 (10-14-20 @ 08:28)  HR: 74 (10-14-20 @ 08:28) (74 - 74)  BP: 129/68 (10-14-20 @ 08:28) (129/68 - 129/68)  RR: 18 (10-14-20 @ 08:28) (18 - 18)  SpO2: 98% (10-14-20 @ 08:28) (98% - 98%)  Wt(kg): --    I&O's Summary    14 Oct 2020 07:01  -  14 Oct 2020 12:42  --------------------------------------------------------  IN: 1300 mL / OUT: 0 mL / NET: 1300 mL        CAPILLARY BLOOD GLUCOSE      POCT Blood Glucose.: 128 mg/dL (14 Oct 2020 12:27)  POCT Blood Glucose.: 93 mg/dL (14 Oct 2020 08:09)    PHYSICAL EXAM:    GENERAL: Comfortable, no acute distress   HEAD:  Normocephalic, atraumatic  EYES: EOMI, PERRLA  HEENT: Moist mucous membranes  NECK: Supple, No JVD  NERVOUS SYSTEM:  Alert & Oriented X3, Motor Strength 5/5 B/L upper and lower extremities  CHEST/LUNG: Clear to auscultation bilaterally  HEART: Regular rate and rhythm  ABDOMEN: Soft, post-op tenderness, Nondistended, Bowel sounds hypoactive, lap sites C/D/I  GENITOURINARY: Voiding, no palpable bladder  EXTREMITIES:   No clubbing, cyanosis, or edema  MUSCULOSKELTAL- No muscle tenderness, no joint tenderness  SKIN-no rash              LABS: pending            IMPRESSION: 31 yo male with above pmh a/w:    #Morbid Obesity  # S/P gastric Sleeve  POD#0  pain control  IVF's  cl liqs per baratric protocol  Monitor CBC/BMP  BGMs with SS  protonix IVP      # RAYMOND  ok to use own CPAP  monitor O2 sats    #VTE prophylaxis  Lovenox  venodynes BLE  AMB    Thank you for the consult, will follow with you               PCP:    CHIEF COMPLAINT:  Morbid Obesity    HISTORY OF THE PRESENT ILLNESS: This a 31 yo male with PMH: GERD, HLD, sleep apnea and  morbid obesity who failed diet, exercise and usual modalities for weight loss and is now S/P Gastric Sleeve.  Patient is seen in PACU, in NAD, still  sedated from anesthesia. We are consulted for Medical management    PAST MEDICAL HISTORY: as above    PAST SURGICAL HISTORY: Upper endo    FAMILY HISTORY:   DM    SOCIAL HISTORY:  no smoking, social alcohol, no drugs    ALLERGIES: NKDA    HOME MEDS: prilosec    REVIEW OF SYSTEMS:   All 10 systems reviewed in detailed and found to be negative with the exception of what has already been described above    MEDICATIONS  (STANDING):  aprepitant 80 milliGRAM(s) Oral once  heparin   Injectable 5000 Unit(s) SubCutaneous once  lactated ringers. 1000 milliLiter(s) (75 mL/Hr) IV Continuous <Continuous>  pantoprazole  Injectable 40 milliGRAM(s) IV Push daily    MEDICATIONS  (PRN):  HYDROmorphone  Injectable 0.5 milliGRAM(s) IV Push every 10 minutes PRN Moderate Pain (4 - 6)  ondansetron Injectable 4 milliGRAM(s) IV Push once PRN Nausea and/or Vomiting      VITALS:  T(F): 97.5 (10-14-20 @ 08:28), Max: 97.5 (10-14-20 @ 08:28)  HR: 74 (10-14-20 @ 08:28) (74 - 74)  BP: 129/68 (10-14-20 @ 08:28) (129/68 - 129/68)  RR: 18 (10-14-20 @ 08:28) (18 - 18)  SpO2: 98% (10-14-20 @ 08:28) (98% - 98%)  Wt(kg): --    I&O's Summary    14 Oct 2020 07:01  -  14 Oct 2020 12:42  --------------------------------------------------------  IN: 1300 mL / OUT: 0 mL / NET: 1300 mL    CAPILLARY BLOOD GLUCOSE  POCT Blood Glucose.: 128 mg/dL (14 Oct 2020 12:27)  POCT Blood Glucose.: 93 mg/dL (14 Oct 2020 08:09)    PHYSICAL EXAM:  GENERAL: Comfortable, no acute distress   HEAD:  Normocephalic, atraumatic  EYES: EOMI, PERRLA  HEENT: Moist mucous membranes  NECK: Supple, No JVD  NERVOUS SYSTEM:  Alert & Oriented X3, Motor Strength 5/5 B/L upper and lower extremities  CHEST/LUNG: Clear to auscultation bilaterally  HEART: Regular rate and rhythm  ABDOMEN: Soft, post-op tenderness, Nondistended, Bowel sounds hypoactive, lap sites C/D/I  GENITOURINARY: Voiding, no palpable bladder  EXTREMITIES:   No clubbing, cyanosis, or edema  MUSCULOSKELTAL- No muscle tenderness, no joint tenderness  SKIN-no rash    LABS: pending            IMPRESSION: 31 yo male with above pmh a/w:    #Morbid Obesity  # S/P gastric Sleeve  POD#0  pain control  IVF's  cl liqs per baratric protocol  Monitor CBC/BMP  BGMs with SS  protonix IVP    # RAYMOND  ok to use own CPAP  monitor O2 sats    #VTE prophylaxis  Lovenox  venodynes BLE  AMB    Thank you for the consult, will follow with you

## 2020-10-14 NOTE — BRIEF OPERATIVE NOTE - NSICDXBRIEFPROCEDURE_GEN_ALL_CORE_FT
PROCEDURES:  EGD 14-Oct-2020 10:29:38  Chelsie Flood  Laparoscopic gastrectomy 14-Oct-2020 10:29:33  Chelsie Flood

## 2020-10-14 NOTE — CONSULT NOTE ADULT - ATTENDING COMMENTS
Patient is seen and examined at bedside with JUANA More. He is in PACU, has some postop abd discomfort. Agree with above assessment and plan. D/w pt

## 2020-10-14 NOTE — CHART NOTE - NSCHARTNOTEFT_GEN_A_CORE
Operative Note              Patient: Boaz Chacko- Lisset  : Oct 10, 1990    MRN: [id number]   Surgery date: 2020     Pre-Operative Diagnosis: Refractory morbid obesity with multiple comorbid conditions.     Post-operative Diagnosis: Same       Primary Procedure    [12085] Lap Longitudinal Gastrectomy        Secondary Procedure(s)    [63483] Uppr Gi Endoscopy, Diagnosis   [31149] Transversus Abdominis Plan (TAP) Block Bilateral        Surgeon(s)    Surgeon: Froy You M.D.   Assistant surgeon: Chelsie Kearns RPA & Dr. Chio Estrada      Surgery Date/Times     Surgery Date: 2020     Admission Date: 2020        Anesthesia    Anesthesia type: General Anesthesia      Specimens      Other Specimens: partial gastrectomy sent to pathology     Fluids     Fluids Out (ml)   Estimated blood loss: 30 ml                 Notes  DRAINS: NONE     COMPLICATIONS: NONE      INDICATIONS OF THE PROCEDURE: The patient is a 29-year-old female who is morbidly obese with a body mass index of 42. The patient has multiple comorbidities due to her morbid obesity including Hyperlipidemia . The patient has failed multiple nonoperative attempts at weight loss. The patient meets NIH criteria for bariatric surgery and underwent appropriate preoperative workup, education, and signed the consent form freely. The patient had risks and benefits explained including, but not limited to, bleeding, leak, infection, disability, injury to transient structures, aspiration, DVT, pulmonary embolism, and death. The patient understood and agreed to proceed with surgery.      DESCRIPTION OF PROCEDURE: The patient was identified properly via a time-out. The patient was brought into the operating room and was placed on the operating room table in supine position. The patient was then given general endotracheal anesthesia and was given preoperative antibiotics. Preoperative heparin was given in the ambulatory surgery unit. The patient was then prepped and draped in the usual sterile fashion. An Exparel mixture was mixed at the back table with 20 mL of Exparel, 30 mL of 0.25% Marcaine and 150 mL of normal saline. A Veress needle was placed in the left upper quadrant. The abdomen was insufflated to 15 millimeters of mercury. Once the abdomen was insufflated, the Exparel mixture was given subcutaneously at the sites of incision. An incision was made within the umbilicus and a 12-millimeter trocar was placed in the abdomen. The laparoscope was inserted and there was no injury on initial trocar placement or initial Veress needle placement. A Transversus Abdominus Plane Block was then conducted by placing 20 mL of the Exparel mixture preperitoneal at the distribution of the spinal nerves on the lateral abdominal wall. This was started at the costal margin at the mid axillary line. 20cc of the Exparel mixture was placed in this area. Another 20 mL was placed four centimeters caudal to this area. Another 20 mL was placed four centimeters caudal to this area and another 15 mL was placed four centimeters caudal to this area. This was repeated on the opposite side of the body in a similar fashion. The rest of the Exparel was placed into the subcutaneous tissues and preperitoneal at every trocar site. A 5-millimeter and 15-millimeter trocar was placed in the right upper quadrant. A 12-millimeter and 5-millimeter trocar was placed in left upper quadrant. An incision was made in subxiphoid area and a liver retractor was placed to hold the liver anteriorly and superiorly and was held in place using Mediflex device. The patient was then placed into steep reverse Trendelenburg position and a point along the stomach was then measured approximately 5 centimeters proximal to the pylorus and the greater curvature of the stomach was taken down from that point. The greater curvature was taken down all the way to the angle of His and the stomach was removed from the left crura using the Ligasure device. At this point, the stomach was completely mobilized. A 40-Irish bougie was then placed into the stomach and placed into the antrum. At this point a 60 millimeter iDrive stapler with a tri-staple black load was used to start transecting the stomach along the bougie, approximately 5 more firings using the purple load were used to staple the stomach to wrap around the bougie to make a nice sleeve around the bougie. Once the stomach was completely transected, the stomach was placed into an EndoCatch bag and removed from the abdomen. A Vicryl suture was used to oversew the staple line in a Lembert fashion from the top of the suture line to about FCI down the sleeve gastrectomy in a continuous running fashion. Two sutures, 3-0 sutures were placed in the antrum of the stomach to the omental fat so that the sleeve would not rotate. Hemostasis was achieved, the remaining stomach was placed under saline solution and an endoscopy was then conducted. There was no leak from the staple line. There was no bleeding or stricture at the staple line. Once the endoscopy was completed, complete hemostasis was assured. The 15-millimeter trocar was then removed and the fascia was closed with a 0 Vicryl suture using the suture passer. Upon completion of the procedure, the abdomen was desufflated and all trocars were removed. The skin was closed with 4-0 Monocryl running subcuticular sutures. The patient tolerated the procedure well.     Disposition      To recovery room, VS stable.         PHYSICIAN SIGNATURE: ___________________________ DATE:____/____/______                                                   Froy You M.D.       This document was electronically signed by Froy You on Oct 14, 2020 11:55 AM Local Standard Time.

## 2020-10-15 ENCOUNTER — TRANSCRIPTION ENCOUNTER (OUTPATIENT)
Age: 30
End: 2020-10-15

## 2020-10-15 VITALS
DIASTOLIC BLOOD PRESSURE: 74 MMHG | HEART RATE: 111 BPM | SYSTOLIC BLOOD PRESSURE: 136 MMHG | OXYGEN SATURATION: 97 % | TEMPERATURE: 98 F | RESPIRATION RATE: 16 BRPM

## 2020-10-15 LAB
ANION GAP SERPL CALC-SCNC: 4 MMOL/L — LOW (ref 5–17)
BASOPHILS # BLD AUTO: 0.01 K/UL — SIGNIFICANT CHANGE UP (ref 0–0.2)
BASOPHILS NFR BLD AUTO: 0.1 % — SIGNIFICANT CHANGE UP (ref 0–2)
BUN SERPL-MCNC: 9 MG/DL — SIGNIFICANT CHANGE UP (ref 7–23)
CALCIUM SERPL-MCNC: 8.8 MG/DL — SIGNIFICANT CHANGE UP (ref 8.5–10.1)
CHLORIDE SERPL-SCNC: 107 MMOL/L — SIGNIFICANT CHANGE UP (ref 96–108)
CO2 SERPL-SCNC: 29 MMOL/L — SIGNIFICANT CHANGE UP (ref 22–31)
CREAT SERPL-MCNC: 1.09 MG/DL — SIGNIFICANT CHANGE UP (ref 0.5–1.3)
EOSINOPHIL # BLD AUTO: 0 K/UL — SIGNIFICANT CHANGE UP (ref 0–0.5)
EOSINOPHIL NFR BLD AUTO: 0 % — SIGNIFICANT CHANGE UP (ref 0–6)
GLUCOSE SERPL-MCNC: 96 MG/DL — SIGNIFICANT CHANGE UP (ref 70–99)
HCT VFR BLD CALC: 39.8 % — SIGNIFICANT CHANGE UP (ref 39–50)
HGB BLD-MCNC: 13.1 G/DL — SIGNIFICANT CHANGE UP (ref 13–17)
IMM GRANULOCYTES NFR BLD AUTO: 0.2 % — SIGNIFICANT CHANGE UP (ref 0–1.5)
LYMPHOCYTES # BLD AUTO: 1.63 K/UL — SIGNIFICANT CHANGE UP (ref 1–3.3)
LYMPHOCYTES # BLD AUTO: 12.2 % — LOW (ref 13–44)
MCHC RBC-ENTMCNC: 29.6 PG — SIGNIFICANT CHANGE UP (ref 27–34)
MCHC RBC-ENTMCNC: 32.9 GM/DL — SIGNIFICANT CHANGE UP (ref 32–36)
MCV RBC AUTO: 90 FL — SIGNIFICANT CHANGE UP (ref 80–100)
MONOCYTES # BLD AUTO: 1.24 K/UL — HIGH (ref 0–0.9)
MONOCYTES NFR BLD AUTO: 9.3 % — SIGNIFICANT CHANGE UP (ref 2–14)
NEUTROPHILS # BLD AUTO: 10.4 K/UL — HIGH (ref 1.8–7.4)
NEUTROPHILS NFR BLD AUTO: 78.2 % — HIGH (ref 43–77)
PLATELET # BLD AUTO: 312 K/UL — SIGNIFICANT CHANGE UP (ref 150–400)
POTASSIUM SERPL-MCNC: 4 MMOL/L — SIGNIFICANT CHANGE UP (ref 3.5–5.3)
POTASSIUM SERPL-SCNC: 4 MMOL/L — SIGNIFICANT CHANGE UP (ref 3.5–5.3)
RBC # BLD: 4.42 M/UL — SIGNIFICANT CHANGE UP (ref 4.2–5.8)
RBC # FLD: 12.2 % — SIGNIFICANT CHANGE UP (ref 10.3–14.5)
SODIUM SERPL-SCNC: 140 MMOL/L — SIGNIFICANT CHANGE UP (ref 135–145)
WBC # BLD: 13.31 K/UL — HIGH (ref 3.8–10.5)
WBC # FLD AUTO: 13.31 K/UL — HIGH (ref 3.8–10.5)

## 2020-10-15 PROCEDURE — 99232 SBSQ HOSP IP/OBS MODERATE 35: CPT

## 2020-10-15 RX ADMIN — Medication 30 MILLIGRAM(S): at 10:35

## 2020-10-15 RX ADMIN — Medication 1000 MILLIGRAM(S): at 10:41

## 2020-10-15 RX ADMIN — SODIUM CHLORIDE 150 MILLILITER(S): 9 INJECTION, SOLUTION INTRAVENOUS at 07:46

## 2020-10-15 RX ADMIN — Medication 30 MILLIGRAM(S): at 05:13

## 2020-10-15 RX ADMIN — Medication 1000 MILLIGRAM(S): at 05:14

## 2020-10-15 RX ADMIN — PANTOPRAZOLE SODIUM 40 MILLIGRAM(S): 20 TABLET, DELAYED RELEASE ORAL at 10:35

## 2020-10-15 RX ADMIN — Medication 30 MILLIGRAM(S): at 05:14

## 2020-10-15 RX ADMIN — OXYCODONE HYDROCHLORIDE 10 MILLIGRAM(S): 5 TABLET ORAL at 00:56

## 2020-10-15 RX ADMIN — Medication 30 MILLIGRAM(S): at 10:41

## 2020-10-15 RX ADMIN — ENOXAPARIN SODIUM 40 MILLIGRAM(S): 100 INJECTION SUBCUTANEOUS at 10:35

## 2020-10-15 RX ADMIN — Medication 400 MILLIGRAM(S): at 10:41

## 2020-10-15 RX ADMIN — Medication 400 MILLIGRAM(S): at 05:13

## 2020-10-15 RX ADMIN — OXYCODONE HYDROCHLORIDE 10 MILLIGRAM(S): 5 TABLET ORAL at 01:26

## 2020-10-15 NOTE — DISCHARGE NOTE PROVIDER - INSTRUCTIONS
please see pre op class packet for diet advancement instruction. OK to start full liquid protein shake diet on 10/17/20

## 2020-10-15 NOTE — DISCHARGE NOTE PROVIDER - CARE PROVIDER_API CALL
Froy You)  Surgery  [address], [address]  [place] [address]  Phone: [phone]  Fax: [phone]  Follow Up Time:

## 2020-10-15 NOTE — DISCHARGE NOTE NURSING/CASE MANAGEMENT/SOCIAL WORK - PATIENT PORTAL LINK FT
You can access the FollowMyHealth Patient Portal offered by [facility] by registering at the following website: [email or web address]. By joining [employer]’s FollowMyHealth portal, you will also be able to view your health information using other applications (apps) compatible with our system.

## 2020-10-15 NOTE — PROGRESS NOTE ADULT - SUBJECTIVE AND OBJECTIVE BOX
PCP:    CHIEF COMPLAINT:  Morbid Obesity    HISTORY OF THE PRESENT ILLNESS: This a 31 yo male with PMH: GERD, HLD, sleep apnea and  morbid obesity who failed diet, exercise and usual modalities for weight loss and is now S/P Gastric Sleeve.  Patient is seen in PACU, in NAD, still  sedated from anesthesia. We are consulted for Medical management    PAST MEDICAL HISTORY: as above    PAST SURGICAL HISTORY: Upper endo    FAMILY HISTORY:   DM    SOCIAL HISTORY:  no smoking, social alcohol, no drugs    ALLERGIES: NKDA    HOME MEDS: prilosec    10/15/20- up and ambulating, feels good. Wants to go home today    REVIEW OF SYSTEMS:   All 10 systems reviewed in detailed and found to be negative with the exception of what has already been described above    MEDICATIONS  (STANDING):  aprepitant 80 milliGRAM(s) Oral once  dextrose 5%. 1000 milliLiter(s) (50 mL/Hr) IV Continuous <Continuous>  dextrose 50% Injectable 12.5 Gram(s) IV Push once  dextrose 50% Injectable 25 Gram(s) IV Push once  dextrose 50% Injectable 25 Gram(s) IV Push once  enoxaparin Injectable 40 milliGRAM(s) SubCutaneous every 12 hours  heparin   Injectable 5000 Unit(s) SubCutaneous once  insulin lispro (HumaLOG) corrective regimen sliding scale   SubCutaneous every 6 hours  lactated ringers. 1000 milliLiter(s) (150 mL/Hr) IV Continuous <Continuous>  pantoprazole  Injectable 40 milliGRAM(s) IV Push daily    MEDICATIONS  (PRN):  dextrose 40% Gel 15 Gram(s) Oral once PRN Blood Glucose LESS THAN 70 milliGRAM(s)/deciliter  glucagon  Injectable 1 milliGRAM(s) IntraMuscular once PRN Glucose LESS THAN 70 milligrams/deciliter  LORazepam   Injectable 0.5 milliGRAM(s) IV Push every 6 hours PRN Anxiety  ondansetron Injectable 4 milliGRAM(s) IV Push every 6 hours PRN Nausea and/or Vomiting  oxyCODONE    Solution 5 milliGRAM(s) Oral every 4 hours PRN Mild Pain (1 - 3)  oxyCODONE    Solution 10 milliGRAM(s) Oral every 4 hours PRN Moderate Pain (4 - 6)    Vital Signs Last 24 Hrs  T(C): 36.7 (15 Oct 2020 08:30), Max: 37.3 (15 Oct 2020 00:24)  T(F): 98 (15 Oct 2020 08:30), Max: 99.1 (15 Oct 2020 00:24)  HR: 111 (15 Oct 2020 08:30) (98 - 114)  BP: 136/74 (15 Oct 2020 08:30) (110/70 - 153/80)  BP(mean): --  RR: 16 (15 Oct 2020 08:30) (14 - 21)  SpO2: 97% (15 Oct 2020 08:30) (91% - 99%)    PHYSICAL EXAM:  GENERAL: Comfortable, no acute distress   HEAD:  Normocephalic, atraumatic  EYES: EOMI, PERRLA  HEENT: Moist mucous membranes  NECK: Supple, No JVD  NERVOUS SYSTEM:  Alert & Oriented X3, Motor Strength 5/5 B/L upper and lower extremities  CHEST/LUNG: Clear to auscultation bilaterally  HEART: Regular rate and rhythm  ABDOMEN: Soft, post-op tenderness, Nondistended, Bowel sounds hypoactive, lap sites C/D/I  GENITOURINARY: Voiding, no palpable bladder  EXTREMITIES:   No clubbing, cyanosis, or edema  MUSCULOSKELTAL- No muscle tenderness, no joint tenderness  SKIN-no rash    LABS:                        13.1   13.31 )-----------( 312      ( 15 Oct 2020 07:52 )             39.8     15 Oct 2020 07:52    140    |  107    |  9      ----------------------------<  96     4.0     |  29     |  1.09     Ca    8.8        15 Oct 2020 07:52    CAPILLARY BLOOD GLUCOSE  POCT Blood Glucose.: 94 mg/dL (15 Oct 2020 11:14)  POCT Blood Glucose.: 109 mg/dL (15 Oct 2020 05:18)  POCT Blood Glucose.: 128 mg/dL (14 Oct 2020 23:12)  POCT Blood Glucose.: 151 mg/dL (14 Oct 2020 17:15)  POCT Blood Glucose.: 128 mg/dL (14 Oct 2020 12:27)    IMPRESSION: 31 yo male with above pmh a/w:    #Morbid Obesity  # S/P gastric Sleeve  pain control  tolerating niall clears  ambulating  Incentive spirometry    # RAYMOND  ok to use own CPAP  monitor O2 sats    #VTE prophylaxis  Lovenox  venodynes BLE  AMB    #Dispo- likely home later on today. D/w pt

## 2020-10-15 NOTE — DISCHARGE NOTE PROVIDER - NSDCACTIVITY_GEN_ALL_CORE
No heavy lifting/straining/Do not drive or operate machinery/Do not make important decisions/Showering allowed

## 2020-10-15 NOTE — DISCHARGE NOTE PROVIDER - NSDCHC_MEDRECSTATUS_GEN_ALL_CORE
Chief Complaint   Patient presents with     Prenatal Care     26 weeks and 5 days       
Admission Reconciliation is Completed  Discharge Reconciliation is Completed

## 2020-10-15 NOTE — DISCHARGE NOTE PROVIDER - HOSPITAL COURSE
Pt presented to the [facility] on 10/14/20 for elective bariatric surgery. pt tolerated procedure well and was admitted to [address] [place] [address] Stay and ready for discharge today

## 2020-10-17 DIAGNOSIS — K21.9 GASTRO-ESOPHAGEAL REFLUX DISEASE WITHOUT ESOPHAGITIS: ICD-10-CM

## 2020-10-17 DIAGNOSIS — E78.5 HYPERLIPIDEMIA, UNSPECIFIED: ICD-10-CM

## 2020-10-17 DIAGNOSIS — G47.33 OBSTRUCTIVE SLEEP APNEA (ADULT) (PEDIATRIC): ICD-10-CM

## 2020-10-17 DIAGNOSIS — E66.01 MORBID (SEVERE) OBESITY DUE TO EXCESS CALORIES: ICD-10-CM

## 2021-04-19 ENCOUNTER — EMERGENCY (EMERGENCY)
Facility: HOSPITAL | Age: 31
LOS: 1 days | Discharge: HOME | End: 2021-04-19
Attending: EMERGENCY MEDICINE
Payer: MEDICAID

## 2021-04-19 VITALS
WEIGHT: 190.04 LBS | HEART RATE: 54 BPM | OXYGEN SATURATION: 99 % | HEIGHT: 66 IN | DIASTOLIC BLOOD PRESSURE: 69 MMHG | SYSTOLIC BLOOD PRESSURE: 106 MMHG | RESPIRATION RATE: 16 BRPM | TEMPERATURE: 99 F

## 2021-04-19 DIAGNOSIS — Z98.890 OTHER SPECIFIED POSTPROCEDURAL STATES: Chronic | ICD-10-CM

## 2021-04-19 LAB
ALBUMIN SERPL ELPH-MCNC: 4.1 G/DL — SIGNIFICANT CHANGE UP (ref 3.3–5.2)
ALP SERPL-CCNC: 102 U/L — SIGNIFICANT CHANGE UP (ref 40–120)
ALT FLD-CCNC: 16 U/L — SIGNIFICANT CHANGE UP
ANION GAP SERPL CALC-SCNC: 10 MMOL/L — SIGNIFICANT CHANGE UP (ref 5–17)
AST SERPL-CCNC: 18 U/L — SIGNIFICANT CHANGE UP
BASOPHILS # BLD AUTO: 0.05 K/UL — SIGNIFICANT CHANGE UP (ref 0–0.2)
BASOPHILS NFR BLD AUTO: 0.6 % — SIGNIFICANT CHANGE UP (ref 0–2)
BILIRUB SERPL-MCNC: 0.4 MG/DL — SIGNIFICANT CHANGE UP (ref 0.4–2)
BUN SERPL-MCNC: 11 MG/DL — SIGNIFICANT CHANGE UP (ref 8–20)
CALCIUM SERPL-MCNC: 9.3 MG/DL — SIGNIFICANT CHANGE UP (ref 8.6–10.2)
CHLORIDE SERPL-SCNC: 102 MMOL/L — SIGNIFICANT CHANGE UP (ref 98–107)
CO2 SERPL-SCNC: 28 MMOL/L — SIGNIFICANT CHANGE UP (ref 22–29)
CREAT SERPL-MCNC: 0.94 MG/DL — SIGNIFICANT CHANGE UP (ref 0.5–1.3)
EOSINOPHIL # BLD AUTO: 0.28 K/UL — SIGNIFICANT CHANGE UP (ref 0–0.5)
EOSINOPHIL NFR BLD AUTO: 3.3 % — SIGNIFICANT CHANGE UP (ref 0–6)
GLUCOSE SERPL-MCNC: 85 MG/DL — SIGNIFICANT CHANGE UP (ref 70–99)
HCT VFR BLD CALC: 48.2 % — SIGNIFICANT CHANGE UP (ref 39–50)
HGB BLD-MCNC: 15.9 G/DL — SIGNIFICANT CHANGE UP (ref 13–17)
IMM GRANULOCYTES NFR BLD AUTO: 0.1 % — SIGNIFICANT CHANGE UP (ref 0–1.5)
LIDOCAIN IGE QN: 15 U/L — LOW (ref 22–51)
LYMPHOCYTES # BLD AUTO: 2.16 K/UL — SIGNIFICANT CHANGE UP (ref 1–3.3)
LYMPHOCYTES # BLD AUTO: 25.4 % — SIGNIFICANT CHANGE UP (ref 13–44)
MCHC RBC-ENTMCNC: 30.6 PG — SIGNIFICANT CHANGE UP (ref 27–34)
MCHC RBC-ENTMCNC: 33 GM/DL — SIGNIFICANT CHANGE UP (ref 32–36)
MCV RBC AUTO: 92.7 FL — SIGNIFICANT CHANGE UP (ref 80–100)
MONOCYTES # BLD AUTO: 0.52 K/UL — SIGNIFICANT CHANGE UP (ref 0–0.9)
MONOCYTES NFR BLD AUTO: 6.1 % — SIGNIFICANT CHANGE UP (ref 2–14)
NEUTROPHILS # BLD AUTO: 5.47 K/UL — SIGNIFICANT CHANGE UP (ref 1.8–7.4)
NEUTROPHILS NFR BLD AUTO: 64.5 % — SIGNIFICANT CHANGE UP (ref 43–77)
PLATELET # BLD AUTO: 339 K/UL — SIGNIFICANT CHANGE UP (ref 150–400)
POTASSIUM SERPL-MCNC: 4.6 MMOL/L — SIGNIFICANT CHANGE UP (ref 3.5–5.3)
POTASSIUM SERPL-SCNC: 4.6 MMOL/L — SIGNIFICANT CHANGE UP (ref 3.5–5.3)
PROT SERPL-MCNC: 7 G/DL — SIGNIFICANT CHANGE UP (ref 6.6–8.7)
RBC # BLD: 5.2 M/UL — SIGNIFICANT CHANGE UP (ref 4.2–5.8)
RBC # FLD: 14.6 % — HIGH (ref 10.3–14.5)
SODIUM SERPL-SCNC: 140 MMOL/L — SIGNIFICANT CHANGE UP (ref 135–145)
WBC # BLD: 8.49 K/UL — SIGNIFICANT CHANGE UP (ref 3.8–10.5)
WBC # FLD AUTO: 8.49 K/UL — SIGNIFICANT CHANGE UP (ref 3.8–10.5)

## 2021-04-19 PROCEDURE — G1004: CPT

## 2021-04-19 PROCEDURE — 36415 COLL VENOUS BLD VENIPUNCTURE: CPT

## 2021-04-19 PROCEDURE — 83690 ASSAY OF LIPASE: CPT

## 2021-04-19 PROCEDURE — 85025 COMPLETE CBC W/AUTO DIFF WBC: CPT

## 2021-04-19 PROCEDURE — 96374 THER/PROPH/DIAG INJ IV PUSH: CPT | Mod: XU

## 2021-04-19 PROCEDURE — 99284 EMERGENCY DEPT VISIT MOD MDM: CPT | Mod: 25

## 2021-04-19 PROCEDURE — 74177 CT ABD & PELVIS W/CONTRAST: CPT | Mod: 26,MG

## 2021-04-19 PROCEDURE — 99285 EMERGENCY DEPT VISIT HI MDM: CPT

## 2021-04-19 PROCEDURE — 80053 COMPREHEN METABOLIC PANEL: CPT

## 2021-04-19 PROCEDURE — 74177 CT ABD & PELVIS W/CONTRAST: CPT

## 2021-04-19 RX ORDER — SODIUM CHLORIDE 9 MG/ML
1000 INJECTION INTRAMUSCULAR; INTRAVENOUS; SUBCUTANEOUS ONCE
Refills: 0 | Status: COMPLETED | OUTPATIENT
Start: 2021-04-19 | End: 2021-04-19

## 2021-04-19 RX ORDER — ONDANSETRON 8 MG/1
4 TABLET, FILM COATED ORAL ONCE
Refills: 0 | Status: COMPLETED | OUTPATIENT
Start: 2021-04-19 | End: 2021-04-19

## 2021-04-19 RX ORDER — IOHEXOL 300 MG/ML
30 INJECTION, SOLUTION INTRAVENOUS ONCE
Refills: 0 | Status: COMPLETED | OUTPATIENT
Start: 2021-04-19 | End: 2021-04-19

## 2021-04-19 RX ADMIN — ONDANSETRON 4 MILLIGRAM(S): 8 TABLET, FILM COATED ORAL at 20:15

## 2021-04-19 RX ADMIN — SODIUM CHLORIDE 1000 MILLILITER(S): 9 INJECTION INTRAMUSCULAR; INTRAVENOUS; SUBCUTANEOUS at 20:15

## 2021-04-19 RX ADMIN — IOHEXOL 30 MILLILITER(S): 300 INJECTION, SOLUTION INTRAVENOUS at 20:18

## 2021-04-19 NOTE — ED STATDOCS - CLINICAL SUMMARY MEDICAL DECISION MAKING FREE TEXT BOX
Pt with epigastric pain and vomiting. Recent gastric sleeve. + Bowel sounds. Labs, CT, PO and IV contrast given recent surgical procedure.

## 2021-04-19 NOTE — ED STATDOCS - PROGRESS NOTE DETAILS
reviewed lab work ct abd pelvis results pt to follow up with GI doctor pt explained results and dc instructions PT evaluated by intake physician. HPI/PE/ROS as noted above. Will follow up plan per intake physician.

## 2021-04-19 NOTE — ED STATDOCS - OBJECTIVE STATEMENT
29 y/o M pt with significant PMHx of HLD and obesity presents to the ED c/o nonbloody vomiting 4-5x today with associated abd pain. States he cannot tolerate PO intake. Denies diarrhea, fever. PSHx of gastric sleeve 6 months ago by Dr. Childers at [facility]. He lost 80 pounds so far. Last BM was today. Denies cough and SOB.

## 2021-04-19 NOTE — ED STATDOCS - PATIENT PORTAL LINK FT
You can access the FollowMyHealth Patient Portal offered by [facility] by registering at the following website: [email or web address]. By joining [employer]’s FollowMyHealth portal, you will also be able to view your health information using other applications (apps) compatible with our system.

## 2021-04-19 NOTE — ED STATDOCS - CARE PROVIDER_API CALL
Abdelrahman Merchant)  Gastroenterology; Internal Medicine  [address] [place] [address], [place] [address]  Phone: [phone]  Fax: [phone]  Follow Up Time:

## 2021-04-20 PROBLEM — E66.01 MORBID (SEVERE) OBESITY DUE TO EXCESS CALORIES: Chronic | Status: ACTIVE | Noted: 2020-10-07

## 2021-04-20 PROBLEM — E78.5 HYPERLIPIDEMIA, UNSPECIFIED: Chronic | Status: ACTIVE | Noted: 2020-10-07

## 2021-05-11 ENCOUNTER — EMERGENCY (EMERGENCY)
Facility: HOSPITAL | Age: 31
LOS: 0 days | Discharge: HOME | End: 2021-05-12
Attending: EMERGENCY MEDICINE
Payer: MEDICAID

## 2021-05-11 VITALS — HEIGHT: 66 IN | WEIGHT: 184.97 LBS

## 2021-05-11 DIAGNOSIS — Z98.890 OTHER SPECIFIED POSTPROCEDURAL STATES: Chronic | ICD-10-CM

## 2021-05-11 DIAGNOSIS — R11.2 NAUSEA WITH VOMITING, UNSPECIFIED: ICD-10-CM

## 2021-05-11 DIAGNOSIS — Z98.84 BARIATRIC SURGERY STATUS: ICD-10-CM

## 2021-05-11 LAB
ALBUMIN SERPL ELPH-MCNC: 4.1 G/DL — SIGNIFICANT CHANGE UP (ref 3.3–5)
ALP SERPL-CCNC: 102 U/L — SIGNIFICANT CHANGE UP (ref 40–120)
ALT FLD-CCNC: 27 U/L — SIGNIFICANT CHANGE UP (ref 12–78)
ANION GAP SERPL CALC-SCNC: 2 MMOL/L — LOW (ref 5–17)
AST SERPL-CCNC: 22 U/L — SIGNIFICANT CHANGE UP (ref 15–37)
BASOPHILS # BLD AUTO: 0.06 K/UL — SIGNIFICANT CHANGE UP (ref 0–0.2)
BASOPHILS NFR BLD AUTO: 0.8 % — SIGNIFICANT CHANGE UP (ref 0–2)
BILIRUB SERPL-MCNC: 0.5 MG/DL — SIGNIFICANT CHANGE UP (ref 0.2–1.2)
BUN SERPL-MCNC: 17 MG/DL — SIGNIFICANT CHANGE UP (ref 7–23)
CALCIUM SERPL-MCNC: 9.3 MG/DL — SIGNIFICANT CHANGE UP (ref 8.5–10.1)
CHLORIDE SERPL-SCNC: 104 MMOL/L — SIGNIFICANT CHANGE UP (ref 96–108)
CO2 SERPL-SCNC: 29 MMOL/L — SIGNIFICANT CHANGE UP (ref 22–31)
CREAT SERPL-MCNC: 1.02 MG/DL — SIGNIFICANT CHANGE UP (ref 0.5–1.3)
EOSINOPHIL # BLD AUTO: 0.46 K/UL — SIGNIFICANT CHANGE UP (ref 0–0.5)
EOSINOPHIL NFR BLD AUTO: 5.8 % — SIGNIFICANT CHANGE UP (ref 0–6)
GLUCOSE SERPL-MCNC: 81 MG/DL — SIGNIFICANT CHANGE UP (ref 70–99)
HCT VFR BLD CALC: 47.7 % — SIGNIFICANT CHANGE UP (ref 39–50)
HGB BLD-MCNC: 16.2 G/DL — SIGNIFICANT CHANGE UP (ref 13–17)
IMM GRANULOCYTES NFR BLD AUTO: 0.3 % — SIGNIFICANT CHANGE UP (ref 0–1.5)
LIDOCAIN IGE QN: 105 U/L — SIGNIFICANT CHANGE UP (ref 73–393)
LYMPHOCYTES # BLD AUTO: 2.62 K/UL — SIGNIFICANT CHANGE UP (ref 1–3.3)
LYMPHOCYTES # BLD AUTO: 33.2 % — SIGNIFICANT CHANGE UP (ref 13–44)
MCHC RBC-ENTMCNC: 30.9 PG — SIGNIFICANT CHANGE UP (ref 27–34)
MCHC RBC-ENTMCNC: 34 GM/DL — SIGNIFICANT CHANGE UP (ref 32–36)
MCV RBC AUTO: 91 FL — SIGNIFICANT CHANGE UP (ref 80–100)
MONOCYTES # BLD AUTO: 0.46 K/UL — SIGNIFICANT CHANGE UP (ref 0–0.9)
MONOCYTES NFR BLD AUTO: 5.8 % — SIGNIFICANT CHANGE UP (ref 2–14)
NEUTROPHILS # BLD AUTO: 4.28 K/UL — SIGNIFICANT CHANGE UP (ref 1.8–7.4)
NEUTROPHILS NFR BLD AUTO: 54.1 % — SIGNIFICANT CHANGE UP (ref 43–77)
PLATELET # BLD AUTO: 274 K/UL — SIGNIFICANT CHANGE UP (ref 150–400)
POTASSIUM SERPL-MCNC: 4.3 MMOL/L — SIGNIFICANT CHANGE UP (ref 3.5–5.3)
POTASSIUM SERPL-SCNC: 4.3 MMOL/L — SIGNIFICANT CHANGE UP (ref 3.5–5.3)
PROT SERPL-MCNC: 7.9 GM/DL — SIGNIFICANT CHANGE UP (ref 6–8.3)
RBC # BLD: 5.24 M/UL — SIGNIFICANT CHANGE UP (ref 4.2–5.8)
RBC # FLD: 13.4 % — SIGNIFICANT CHANGE UP (ref 10.3–14.5)
SODIUM SERPL-SCNC: 135 MMOL/L — SIGNIFICANT CHANGE UP (ref 135–145)
WBC # BLD: 7.9 K/UL — SIGNIFICANT CHANGE UP (ref 3.8–10.5)
WBC # FLD AUTO: 7.9 K/UL — SIGNIFICANT CHANGE UP (ref 3.8–10.5)

## 2021-05-11 PROCEDURE — 85025 COMPLETE CBC W/AUTO DIFF WBC: CPT

## 2021-05-11 PROCEDURE — 83690 ASSAY OF LIPASE: CPT

## 2021-05-11 PROCEDURE — 96375 TX/PRO/DX INJ NEW DRUG ADDON: CPT | Mod: XU

## 2021-05-11 PROCEDURE — 74177 CT ABD & PELVIS W/CONTRAST: CPT

## 2021-05-11 PROCEDURE — 96374 THER/PROPH/DIAG INJ IV PUSH: CPT | Mod: XU

## 2021-05-11 PROCEDURE — 93010 ELECTROCARDIOGRAM REPORT: CPT

## 2021-05-11 PROCEDURE — 80053 COMPREHEN METABOLIC PANEL: CPT

## 2021-05-11 PROCEDURE — 36415 COLL VENOUS BLD VENIPUNCTURE: CPT

## 2021-05-11 PROCEDURE — 99285 EMERGENCY DEPT VISIT HI MDM: CPT

## 2021-05-11 PROCEDURE — 74177 CT ABD & PELVIS W/CONTRAST: CPT | Mod: 26,MA

## 2021-05-11 PROCEDURE — 93005 ELECTROCARDIOGRAM TRACING: CPT

## 2021-05-11 PROCEDURE — 99284 EMERGENCY DEPT VISIT MOD MDM: CPT | Mod: 25

## 2021-05-11 RX ORDER — ONDANSETRON 8 MG/1
4 TABLET, FILM COATED ORAL ONCE
Refills: 0 | Status: COMPLETED | OUTPATIENT
Start: 2021-05-11 | End: 2021-05-11

## 2021-05-11 RX ORDER — SODIUM CHLORIDE 9 MG/ML
1000 INJECTION INTRAMUSCULAR; INTRAVENOUS; SUBCUTANEOUS ONCE
Refills: 0 | Status: COMPLETED | OUTPATIENT
Start: 2021-05-11 | End: 2021-05-11

## 2021-05-11 RX ORDER — FAMOTIDINE 10 MG/ML
20 INJECTION INTRAVENOUS ONCE
Refills: 0 | Status: COMPLETED | OUTPATIENT
Start: 2021-05-11 | End: 2021-05-11

## 2021-05-11 RX ADMIN — ONDANSETRON 4 MILLIGRAM(S): 8 TABLET, FILM COATED ORAL at 21:34

## 2021-05-11 RX ADMIN — SODIUM CHLORIDE 1000 MILLILITER(S): 9 INJECTION INTRAMUSCULAR; INTRAVENOUS; SUBCUTANEOUS at 22:05

## 2021-05-11 RX ADMIN — FAMOTIDINE 20 MILLIGRAM(S): 10 INJECTION INTRAVENOUS at 21:34

## 2021-05-11 NOTE — ED STATDOCS - PATIENT PORTAL LINK FT
You can access the FollowMyHealth Patient Portal offered by [facility] by registering at the following website: [email or web address]. By joining [employer]’s FollowMyHealth portal, you will also be able to view your health information using other applications (apps) compatible with our system.

## 2021-05-11 NOTE — ED STATDOCS - OBJECTIVE STATEMENT
31 y/o male with pmhx of gastric sleeve 7 months ago performed by  presents to the ED with multiple episodes of vomiting. Pt states onset of symptoms was s/p gastric sleeve surgery. states he as seen at [facility] multiple times with negative workup. Laura pt presents with the same symptoms. No other complaints at this time.

## 2021-05-11 NOTE — ED STATDOCS - CARE PROVIDER_API CALL
Froy You)  Surgery  [address], [address]  [place] [address]  Phone: [phone]  Fax: [phone]  Follow Up Time:

## 2021-05-11 NOTE — ED STATDOCS - PROGRESS NOTE DETAILS
ct normal, call placed to Dr. You. DARREL Hoyt DO spoke to Dr. You, pt can follow up in the office DARREL Hoyt DO

## 2021-05-11 NOTE — ED STATDOCS - NSFOLLOWUPINSTRUCTIONS_ED_ALL_ED_FT
necessita ameya ari con Dr. Avelino Mckeon por telfono manana por ameya ari esta semana necessita christina ari con Dr. Avelino Mckeon por telfono manana por christina ari esta semana      Sleeve Gastrectomy, Care After      Esta hoja le cathryn información sobre cómo cuidarse después del procedimiento. El médico también podrá darle indicaciones más específicas. Comuníquese con el médico si tiene problemas o preguntas.      ¿Qué puedo esperar después del procedimiento?  Después del procedimiento, es común tener los siguientes síntomas:  •Dolor en el abdomen.      •Disminución del apetito.      •Pérdida de un líquido transparente por el pequeño tubo (drenaje) que sale del lugar de la incisión.        Siga estas indicaciones en stephen casa:    Medicamentos     •[place] los medicamentos de venta eleanor y los recetados solamente laex se lo haya indicado el médico.      • No conduzca constantino 24 horas si le administraron un sedante constantino el procedimiento.      • No conduzca ni use maquinaria pesada mientras mirna analgésicos recetados.        Cuidado de la incisión y el drenaje    •Siga las indicaciones del médico acerca del cuidado de las incisiones. Asegúrese de hacer lo siguiente:  •Lávese las pedro con agua y jabón antes y después de cambiar la venda (vendaje). Use desinfectante para pedro si no dispone de agua y jabón.      •Cambie el vendaje alex se lo haya indicado el médico.      •No retire los puntos (suturas), la goma para cerrar la piel o las tiras adhesivas. Es posible que estos cierres cutáneos deban quedar puestos en la piel constantino 2 semanas o más. Si los bordes de las tiras adhesivas empiezan a despegarse y enroscarse, puede recortar los que estén sueltos. No retire las tiras adhesivas por completo a menos que el médico se lo indique.        •Mantenga limpia y seca la daniele que rodea las incisiones y el drenaje.    •Controle todos los días el lugar de las incisiones para detectar signos de infección. Esté atento a los siguientes signos:  •Enrojecimiento, hinchazón o dolor.      •Líquido o rita.      •Calor.      •Pus o mal olor.        •Vacíe el drenaje todos los días. Siga las indicaciones del médico acerca de registrar la cantidad de líquido que sale del drenaje. [place] nota de cualquier cambio en la cantidad o el aspecto del líquido.        Actividad      •Anne reposo alex se lo haya indicado el médico.      •Evite estar sentado constantino largos períodos sin moverse. Levántese y camine un poco cada 1 a 2 horas. [place] es importante para mejorar el flujo sanguíneo y la respiración. Pida ayuda si se siente débil o inestable.      •Retome narinder actividades normales según lo indicado por el médico. Pregúntele al médico qué actividades son seguras para usted.      • No levante ningún objeto que pese más de 10 libras (4,5 kg) o el límite de peso que le hayan indicado, hasta que el médico le diga que puede hacerlo.      •Evite la actividad física intensa constantino el tiempo que le haya indicado el médico.      Comida y bebida   •Siga las indicaciones del médico respecto de las restricciones en las comidas o las bebidas. Recibirá instrucciones sobre el tipo y la cantidad de comidas, y los horarios en los que debe alimentarse.  •Lleve un registro de los alimentos que le causen malestar, alex meteorismo o cólicos.      •Consuma alimentos saludables. Evite los alimentos ricos en grasas o azúcar.        •Deje de comer cuando se sienta satisfecho.      •[place] los suplementos solamente alex se lo haya indicado el médico.      •Bri suficiente líquido alex para mantener la orina de color amarillo pálido.      Indicaciones generales     • No tome maggie de inmersión, no nade ni use el jacuzzi hasta que el médico lo autorice. Pregúntele al médico si puede ducharse. Juan C vez solo le permitan darse maggie de esponja.      • No consuma ningún producto que contenga nicotina o tabaco, alex cigarrillos, cigarrillos electrónicos y tabaco de mascar. Si necesita ayuda para dejar de consumir, consulte al médico.      •Use medias de compresión alex se lo haya indicado stephen médico. Estas medias ayudan a evitar la formación de coágulos de rita y a reducir la hinchazón de las piernas.      •Anne ejercicios de respiración alex se lo haya indicado el médico.      •Concurra a todas las visitas de control alex se lo haya indicado el médico. [place] es importante.        Comuníquese con un médico si tiene:    •Un dolor que empeora o que no mejora con los medicamentos.      •Enrojecimiento, hinchazón o dolor alrededor de las incisiones.      •Le sale líquido o rita de las incisiones.      •Incisiones que están calientes al tacto.      •Tiene pus o percibe que sale mal olor de las incisiones.      •Fiebre o escalofríos.      •Problemas con el drenaje.      •Christina pérdida de un líquido lesli o con mal olor proveniente del drenaje.        Solicite ayuda inmediatamente si tiene:    •Dificultad para respirar.      •Un dolor intenso, especialmente en las piernas.        Resumen    •Después del procedimiento, es frecuente tener dolor en el abdomen, disminución del apetito y pérdida de un líquido transparente proveniente del drenaje en el lugar de christina incisión.      •[place] los medicamentos de venta eleanor y los recetados solamente alex se lo haya indicado el médico.      •Siga las indicaciones del médico acerca del cuidado de las incisiones. Avísele al médico sobre cualquier signo de infección.      •Después de la cirugía, levántese y camine un poco cada 1 a 2 horas. [place] es importante para mejorar el flujo sanguíneo y la respiración. Siga las indicaciones del médico respecto de las restricciones en las comidas o las bebidas.      •Solicite ayuda de inmediato si tiene problemas para respirar o dolor intenso, especialmente en las piernas.      Esta información no tiene alex fin reemplazar el consejo del médico. Asegúrese de hacerle al médico cualquier pregunta que tenga.

## 2021-05-12 VITALS
HEART RATE: 61 BPM | TEMPERATURE: 98 F | OXYGEN SATURATION: 100 % | SYSTOLIC BLOOD PRESSURE: 116 MMHG | DIASTOLIC BLOOD PRESSURE: 85 MMHG | RESPIRATION RATE: 16 BRPM

## 2021-05-12 NOTE — ED ADULT NURSE NOTE - OBJECTIVE STATEMENT
pt presents to the ED with abd pain with associated nasuea/vomitintg. denies fevers, chills. pt is postop from gastric sleeve with Dr. You 7 months ago.

## 2021-07-21 ENCOUNTER — EMERGENCY (EMERGENCY)
Facility: HOSPITAL | Age: 31
LOS: 1 days | Discharge: HOME | End: 2021-07-21
Attending: EMERGENCY MEDICINE
Payer: SELF-PAY

## 2021-07-21 VITALS
SYSTOLIC BLOOD PRESSURE: 122 MMHG | HEIGHT: 66 IN | OXYGEN SATURATION: 99 % | HEART RATE: 76 BPM | TEMPERATURE: 98 F | DIASTOLIC BLOOD PRESSURE: 72 MMHG | RESPIRATION RATE: 18 BRPM

## 2021-07-21 DIAGNOSIS — Z98.890 OTHER SPECIFIED POSTPROCEDURAL STATES: Chronic | ICD-10-CM

## 2021-07-21 PROCEDURE — 96372 THER/PROPH/DIAG INJ SC/IM: CPT

## 2021-07-21 PROCEDURE — 99283 EMERGENCY DEPT VISIT LOW MDM: CPT | Mod: 25

## 2021-07-21 PROCEDURE — 71046 X-RAY EXAM CHEST 2 VIEWS: CPT

## 2021-07-21 PROCEDURE — 99284 EMERGENCY DEPT VISIT MOD MDM: CPT

## 2021-07-21 PROCEDURE — 71046 X-RAY EXAM CHEST 2 VIEWS: CPT | Mod: 26

## 2021-07-21 RX ORDER — METHOCARBAMOL 500 MG/1
2 TABLET, FILM COATED ORAL
Qty: 18 | Refills: 0
Start: 2021-07-21 | End: 2021-07-23

## 2021-07-21 RX ORDER — LIDOCAINE 4 G/100G
1 CREAM TOPICAL ONCE
Refills: 0 | Status: COMPLETED | OUTPATIENT
Start: 2021-07-21 | End: 2021-07-21

## 2021-07-21 RX ORDER — KETOROLAC TROMETHAMINE 30 MG/ML
30 SYRINGE (ML) INJECTION ONCE
Refills: 0 | Status: DISCONTINUED | OUTPATIENT
Start: 2021-07-21 | End: 2021-07-21

## 2021-07-21 RX ORDER — METHOCARBAMOL 500 MG/1
1500 TABLET, FILM COATED ORAL ONCE
Refills: 0 | Status: COMPLETED | OUTPATIENT
Start: 2021-07-21 | End: 2021-07-21

## 2021-07-21 RX ORDER — IBUPROFEN 200 MG
1 TABLET ORAL
Qty: 28 | Refills: 0
Start: 2021-07-21 | End: 2021-07-27

## 2021-07-21 RX ORDER — LIDOCAINE 4 G/100G
1 CREAM TOPICAL ONCE
Refills: 0 | Status: DISCONTINUED | OUTPATIENT
Start: 2021-07-21 | End: 2021-07-21

## 2021-07-21 RX ADMIN — Medication 30 MILLIGRAM(S): at 19:42

## 2021-07-21 RX ADMIN — LIDOCAINE 1 PATCH: 4 CREAM TOPICAL at 19:42

## 2021-07-21 RX ADMIN — METHOCARBAMOL 1500 MILLIGRAM(S): 500 TABLET, FILM COATED ORAL at 19:42

## 2021-07-21 NOTE — ED PROVIDER NOTE - PATIENT PORTAL LINK FT
You can access the FollowMyHealth Patient Portal offered by [facility] by registering at the following website: [email or web address]. By joining [email or web address]’s FollowMyHealth portal, you will also be able to view your health information using other applications (apps) compatible with our system.
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
## 2021-07-21 NOTE — ED PROVIDER NOTE - NSFOLLOWUPINSTRUCTIONS_ED_ALL_ED_FT
- Follow up with your doctor within 2-3 days.   - Return to the ED for any new or worsening symptoms included but not limited to weakness, urinary symptoms, numbness, difficulty walking, bowel/bladder incontinence, fevers, etc  - May take ibuprofen 600mg every 6 hours as needed for pain  - May take robaxin 1500mg every 8 hours as needed for pain  - Avoid heavy lifting, significant exertion  - Apply heating pads/warm compresses to affected area while resting     Back Pain    Back pain is very common in adults. The cause of back pain is rarely dangerous and the pain often gets better over time. The cause of your back pain may not be known and may include strain of muscles or ligaments, degeneration of the spinal disks, or arthritis. Occasionally the pain may radiate down your leg(s). Over-the-counter medicines to reduce pain and inflammation are often the most helpful. Stretching and remaining active frequently helps the healing process.     SEEK IMMEDIATE MEDICAL CARE IF YOU HAVE ANY OF THE FOLLOWING SYMPTOMS: bowel or bladder control problems, unusual weakness or numbness in your arms or legs, nausea or vomiting, abdominal pain, fever, dizziness/lightheadedness.     - Anne un seguimiento con stephen médico dentro de 2-3 días.  - Regrese al servicio de urgencias por cualquier síntoma nuevo o que empeore, incluidos, entre otros, debilidad, síntomas urinarios, entumecimiento, dificultad para caminar, incontinencia intestinal / vesical, fiebre, etc.  - Puede michelle ibuprofeno 600 mg cada 6 horas según sea necesario para el dolor  - Puede michelle robaxin 1500 mg cada 8 horas según sea necesario para el dolor  - Evite levantar objetos pesados, realizar esfuerzos importantes  - Aplique almohadillas térmicas / compresas tibias en el área afectada mientras descansa    Dolor de espalda    El dolor de espalda es muy común en los adultos. La causa del dolor de espalda scott vez es peligrosa y el dolor suele mejorar con el tiempo. Es posible que no se conozca la causa de stephen dolor de espalda y puede incluir distensión de músculos o ligamentos, degeneración de los discos espinales o artritis. Ocasionalmente, el dolor puede irradiarse hacia las piernas. Los medicamentos de venta eleanor para reducir el dolor y la inflamación suelen ser los más útiles. Estirarse y mantenerse activo con frecuencia ayuda al proceso de curación.    BUSQUE ATENCIÓN MÉDICA INMEDIATA SI TIENE ALGUNO DE LOS SIGUIENTES SÍNTOMAS: problemas de control del intestino o de la vejiga, debilidad o entumecimiento inusual en narinder brazos o piernas, náuseas o vómitos, dolor abdominal, fiebre, mareos / aturdimiento.
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
## 2021-07-21 NOTE — ED PROVIDER NOTE - NS ED ROS FT
Gen: denies fever, chills, fatigue, weight loss  Skin: denies rashes, laceration, bruising  HEENT: denies visual changes, ear pain, nasal congestion, throat pain  Respiratory: denies PEREZ, SOB, cough, wheezing  Cardiovascular: denies chest pain, palpitations, diaphoresis, LE edema  GI: denies abdominal pain, n/v/d  : denies dysuria, frequency, urgency, bowel/bladder incontinence  MSK: +BACK PAIN, +NECK PAIN. denies joint swelling/pain  Neuro: denies headache, dizziness, weakness, numbness  Psych: denies anxiety, depression, SI/HI, visual/auditory hallucinations

## 2022-06-14 NOTE — H&P PST ADULT - SURGICAL SITE INCISION
Writer spoke with Camille from [facility] and informed her to cancel the [facility]. Camille verbalized.    no

## 2022-11-17 NOTE — H&P PST ADULT - SOURCE OF INFORMATION, PROFILE
patient [de-identified] : \par AUDIOGRAM (01/15/2014)\par RIGHT:  Hearing within normal limits.    \par LEFT:   Hearing within normal limits. \par Tympanograms  A  bilateral    \par Word recognition 100%  bilateral   \par

## 2023-06-27 NOTE — ED PROVIDER NOTE - NS ED ATTENDING STATEMENT MOD
[FreeTextEntry1] : 54y/o male with urinary frequency and weak flow for the last week. \par Referred to ER and started ABX therapy, on going.\par Denies history of stones. \par The patient refers no more frequency but nocturia.\par \par Kidney percussion test negative bilaterally, no pain reported on bi-manual palpation bilaterally, no pain on superficial and deep palpation on the iliac fossa bilaterally and on the ureteral points \par \par Last PSA: 2.08\par \par Collecting urine for culture + UA.\par On tamsulosin. Ordering TRUS for prostate vol rodriguez.\par \par Will F/U in case of positive results \par \par 
Attending with

## 2023-10-13 NOTE — ED PROVIDER NOTE - PRO INTERPRETER NEED 2
77 years old female with pmhhx of morbid obesity, HTN, HLD, CHF, A. fib, COPD, LIEDY presented w/ dyspnea due to acute respiratory failure with hypoxia and hypercapnia due to acute on chronic systolic congestive heart failure and CAP, initially requiring BIPAP but now on 0-2L NC. Hospital course complicated by afib w/ RVR       # Acute on chronic systolic congestive heart failure   continue with medication regimen    - c/w BiPAP QHS  # afib-rate controlled continue with ac and cardizem     # HTN- bp stable    # HLD- continue with statin    # acute hypoxemic respiratory failure  - c/w 2L NC during the daytime as tolerated  - c/w BiPAP QHS settings 14/7 @ 30% with backup rate of 16    # CAP- s/p antibx     # pulmonary nodule-4mm left nodule at apex   will need outpt f/u with pulmonary and subsequent scan     # Euthyroid sick syndrome  - TSH is 0.133, free thyroxine is 1.2  
[place] Island[place]

## 2024-01-29 NOTE — ED ADULT TRIAGE NOTE - CHIEF COMPLAINT QUOTE
Subjective     HPI   Shekhar Adams is a 24 y.o. year old male patient with presenting to clinic with concern for No chief complaint on file.      Shekhar presents to clinic to establish care. He was formerly seen by Dr. Diaz.    Excessive sweating. Everywhere. Interefering with daily activities. Has had groin abscesses in the past.   Advised on hygiene recommendations including wearing deoderant at night.     Hx abnormal (iron?) counts? Several years ago, pt unsure.  [facility]    Patient Active Problem List   Diagnosis    Asthma       Past Medical History:   Diagnosis Date    Asthma 2011      Past Surgical History:   Procedure Laterality Date    WISDOM TOOTH EXTRACTION  2017      Family History   Problem Relation Name Age of Onset    Arthritis Mother Emely Adams     Blood Disorder Mother Emely Adams     Depression Mother Emely Adams     Diabetes Mother Emely Adasm     Hypertension Mother Emely Adasm     Mental illness Mother Emely Adams     Miscarriages / Stillbirths Mother Emely Adams     Arthritis Father Guanaco Adams     Blood Disorder Father Guanaco Adams     Heart disease Father Guanaco Adams     Cancer Maternal Grandfather Tino Restrepo     Diabetes Maternal Grandfather Tino Restrepo     Stroke Maternal Grandmother Ermelinda Restrepo       Social History     Tobacco Use    Smoking status: Never    Smokeless tobacco: Never    Tobacco comments:     No tobacco, but Vape Nicotine.   Substance Use Topics    Alcohol use: Yes     Alcohol/week: 4.0 standard drinks of alcohol     Types: 2 Cans of beer, 2 Shots of liquor per week      No current outpatient medications on file.     Review of Systems  Constitutional: Denies fever  HEENT: Denies ST, earache  CVS: Denies Chest pain  Pulmonary: Denies wheezing, SOB  GI: Denies N/V  : Denies dysuria  Musculoskeletal:  Denies myalgia  Neuro: Denies focal weakness or numbness.  Skin: Denies Rashes.  *Review of Systems is negative unless  otherwise mentioned in HPI or ROS above.    Objective   There were no vitals taken for this visit. reviewed There is no height or weight on file to calculate BMI.     Physical Exam  Constitutional: NAD.  Resting comfortably.  Head: Atraumatic, normocephalic.  ENT: Moist oral mucosa. Nasal mucosa wnl.   Cardiac: Regular rate & rhythm.   Pulmonary: Lungs clear bilat  GI: Soft, Nontender, nondistended.   Musculoskeletal: No peripheral edema.   Skin: No evidence of trauma. No rashes  Psych: Intact judgement and insight.    .Assessment/Plan   Problem List Items Addressed This Visit             ICD-10-CM    Asthma J45.909     Well controlled          Other Visit Diagnoses         Codes    Hyperhidrosis    -  Primary R61    Relevant Orders    Referral to Dermatology    Family history of heart disease     Z82.49    Relevant Orders    Referral to Cardiology    Borderline hyperlipidemia     E78.5    Relevant Orders    CBC    Comprehensive Metabolic Panel    TSH with reflex to Free T4 if abnormal    Lipid Panel    Vitamin D insufficiency     E55.9    Relevant Orders    Vitamin D 25-Hydroxy,Total (for eval of Vitamin D levels)             Patient A&Ox4 complaining of abdominal pain & nausea that began today.

## 2024-02-20 NOTE — PATIENT PROFILE ADULT - NSPROPTRIGHTCAREGIVER_GEN_A_NUR
BREAST SURGERY DISCHARGE INSTRUCTIONS    I. MEDICATIONS   A. Pain - You may take Extra-Strength Tylenol or acetaminophen 500 - 1000 mg every 6 hours. If the Tylenol is not strong enough, you make take the Ibuprofen 600 mg every 6 hours as well. It will help to stagger the doses and take one or the other every 3 hours. Take Ibuprofen with food.  B. Bowel Medication - Preventive: Metamucil, Citrucel or Benefiber (over the counter) 1 to 3 times a day as directed. If more severely constipated, take Milk of Magnesia or MiraLAX as directed on the package (all are over the counter).   C. Other Medication: Resume your home medications as directed on the medication reconciliation list.  D. Ice Pack: You may use an ice pack for the first 48 hours on and off. Do not apply the ice or ice pack directly on your skin nor longer than 20 minutes at a time.       II. WOUND   A. Some minor bleeding, bruising and swelling is expected. If bleeding persists, apply steady pressure for 10 minutes. Notify your doctor if it does not stop after 10 minutes.   B. Keep the surgical bra or wrap on for 48 hours after surgery. On the 3rd day after surgery, you may remove and discard the gauze fluffs and the wrap if you have one.  C. There are Steri-Strips or white tapes over your incision(s). Leave these in place until you return to clinic. Ok to shower with these in place; do not scrub, pat dry gently.  C. Wear your surgical bra or a tight fitting supportive bra without underwire day and night for 14 days. Please be sure the bra covers your entire chest wall up to your arm and provides compression of the axilla if you had axillary surgery (sentinel node biopsy). If your bra does not, please use a sock, washcloth, or gauze as extra padding and compression in the axilla.  D. Notify your doctor's office if your wound becomes bright red, hot, opens, drains anything other than a pinkish clear fluid, or if your temperature is greater than 101 degrees.    E. Redness will develop around the wound(s) within 36 hours and then discoloration will develop into purple bruising and then yellow green. This is normal. Increasing swelling and pain is not normal after 4 days. Please call your doctor if you experience increasing pain and swelling after 4 days   F. A bump or “healing ridge” will develop at the incision. It will feel hard for  about 3 months and then it goes away slowly.    III. SHOWER   A. Sponge bathe for 48 hours after surgery  B. You may remove the bra/wrap and shower 48 hours after surgery.    C. Allow soapy water to run over your incisions; do not scrub and pat dry gently. Leave white Steri-Strips in place.     IV. DIET   A. Resume your usual diet.  B. Do not drink alcoholic beverages for 24 hours or while taking pain medications.       V. ACTIVITY   A. Do not drive, operate machinery, sign legal documents, or take responsibility for another person for 24 hours, or while you are taking pain medication.   B. When steady on your feet you can walk and go up stairs slowly and carefully.   C. No strenuous exercise for 14 days.   D. No heavy lifting (more than 10 lbs) for 3 weeks or until cleared by your doctor.    VI. ANESTHESIA:   A. If you had general anesthesia, a breathing tube was used and you may experience a slight sore throat or even hoarseness for 48-72 hours.    VII. QUESTIONS AND FOLLOW-UP  A. You should have been provided with a follow-up appointment in approximately 1 week. If you do not have an appointment, please call the breast center.  B. If you have any questions, please call the breast center or message your doctor through [employer].  C. For any questions or concerns regarding your surgery within the first 72 hours please always call your surgeons office.     [facility]  [address], [address], [place] [address] [phone]    [facility]  [address], [place] [address]  And  [address] [facility] [address] [phone]      Discharge Instructions: After Your Surgery  You’ve just had surgery. During surgery, you were given medicine called anesthesia to keep you relaxed and free of pain. After surgery, you may have some pain or nausea. This is common. Here are some tips for feeling better and getting well after surgery.        Going home  Your healthcare provider will show you how to take care of yourself when you go home. He or she will also answer your questions. Have an adult family member or friend drive you home. For the first 24 hours after your surgery:  Do not drive or use heavy equipment.  Do not make important decisions or sign legal papers.  Do not drink alcohol.  Have someone stay with you, if needed. He or she can watch for problems and help keep you safe.  Be sure to go to all follow-up visits with your healthcare provider. And rest after your surgery for as long as your healthcare provider tells you to.  Coping with pain  If you have pain after surgery, pain medicine will help you feel better. Take it as told, before pain becomes severe. Also, ask your healthcare provider or pharmacist about other ways to control pain. This might be with heat, ice, or relaxation. And follow any other instructions your surgeon or nurse gives you.  Tips for taking pain medicine  To get the best relief possible, remember these points:  Pain medicines can upset your stomach. Taking them with a little food may help.  Most pain relievers taken by mouth need at least 20 to 30 minutes to start to work.  Taking medicine on a schedule can help you remember to take it. Try to time your medicine so that you can take it before starting an activity. This might be before you get dressed, go for a walk, or sit down for dinner.  Constipation is a common side effect of pain medicines. Call your healthcare provider before taking any medicines such as laxatives or stool  softeners to help ease constipation. Also ask if you should skip any foods. Drinking lots of fluids and eating foods such as fruits and vegetables that are high in fiber can also help. Remember, do not take laxatives unless your surgeon has prescribed them.  Drinking alcohol and taking pain medicine can cause dizziness and slow your breathing. It can even be deadly. Do not drink alcohol while taking pain medicine.  Pain medicine can make you react more slowly to things. Do not drive or run machinery while taking pain medicine.  Your healthcare provider may tell you to take acetaminophen to help ease your pain. Ask him or her how much you are supposed to take each day. Acetaminophen or other pain relievers may interact with your prescription medicines or other over-the-counter (OTC) medicines. Some prescription medicines have acetaminophen and other ingredients. Using both prescription and OTC acetaminophen for pain can cause you to overdose. Read the labels on your OTC medicines with care. This will help you to clearly know the list of ingredients, how much to take, and any warnings. It may also help you not take too much acetaminophen. If you have questions or do not understand the information, ask your pharmacist or healthcare provider to explain it to you before you take the OTC medicine.  Managing nausea  Some people have an upset stomach after surgery. This is often because of anesthesia, pain, or pain medicine, or the stress of surgery. These tips will help you handle nausea and eat healthy foods as you get better. If you were on a special food plan before surgery, ask your healthcare provider if you should follow it while you get better. These tips may help:  Do not push yourself to eat. Your body will tell you when to eat and how much.  Start off with clear liquids and soup. They are easier to digest.  Next try semi-solid foods, such as mashed potatoes, applesauce, and gelatin, as you feel ready.  Slowly  move to solid foods. Don’t eat fatty, rich, or spicy foods at first.  Do not force yourself to have 3 large meals a day. Instead eat smaller amounts more often.  Take pain medicines with a small amount of solid food, such as crackers or toast, to avoid nausea.     Call your surgeon if…  You still have pain an hour after taking medicine. The medicine may not be strong enough.  You feel too sleepy, dizzy, or groggy. The medicine may be too strong.  You have side effects like nausea, vomiting, or skin changes, such as rash, itching, or hives.       If you have obstructive sleep apnea  You were given anesthesia medicine during surgery to keep you comfortable and free of pain. After surgery, you may have more apnea spells because of this medicine and other medicines you were given. The spells may last longer than usual.   At home:  Keep using the continuous positive airway pressure (CPAP) device when you sleep. Unless your healthcare provider tells you not to, use it when you sleep, day or night. CPAP is a common device used to treat obstructive sleep apnea.  Talk with your provider before taking any pain medicine, muscle relaxants, or sedatives. Your provider will tell you about the possible dangers of taking these medicines.  Date Last Reviewed: 12/1/2016 © 2000-2018 [employer] [address] [place] [address], [place] [address]. All rights reserved. This information is not intended as a substitute for professional medical care. Always follow your healthcare professional's instructions.           declines

## 2025-02-16 ENCOUNTER — EMERGENCY (EMERGENCY)
Facility: HOSPITAL | Age: 35
LOS: 1 days | Discharge: HOME | End: 2025-02-16
Attending: EMERGENCY MEDICINE
Payer: SELF-PAY

## 2025-02-16 VITALS
SYSTOLIC BLOOD PRESSURE: 115 MMHG | DIASTOLIC BLOOD PRESSURE: 70 MMHG | RESPIRATION RATE: 20 BRPM | WEIGHT: 180.34 LBS | OXYGEN SATURATION: 100 % | HEART RATE: 78 BPM | TEMPERATURE: 98 F | HEIGHT: 66 IN

## 2025-02-16 DIAGNOSIS — Z98.890 OTHER SPECIFIED POSTPROCEDURAL STATES: Chronic | ICD-10-CM

## 2025-02-16 PROCEDURE — 99284 EMERGENCY DEPT VISIT MOD MDM: CPT

## 2025-02-16 PROCEDURE — 99282 EMERGENCY DEPT VISIT SF MDM: CPT

## 2025-02-16 PROCEDURE — 99284 EMERGENCY DEPT VISIT MOD MDM: CPT | Mod: 25

## 2025-02-16 PROCEDURE — 99053 MED SERV 10PM-8AM 24 HR FAC: CPT

## 2025-02-16 PROCEDURE — 70450 CT HEAD/BRAIN W/O DYE: CPT | Mod: MC

## 2025-02-16 PROCEDURE — T1013: CPT

## 2025-02-16 PROCEDURE — 70450 CT HEAD/BRAIN W/O DYE: CPT | Mod: 26

## 2025-02-21 ENCOUNTER — NON-APPOINTMENT (OUTPATIENT)
Age: 35
End: 2025-02-21

## 2025-02-21 ENCOUNTER — APPOINTMENT (OUTPATIENT)
Age: 35
End: 2025-02-21
Payer: MEDICAID

## 2025-02-21 VITALS
OXYGEN SATURATION: 99 % | HEIGHT: 66 IN | DIASTOLIC BLOOD PRESSURE: 71 MMHG | BODY MASS INDEX: 28.93 KG/M2 | TEMPERATURE: 98.4 F | HEART RATE: 66 BPM | WEIGHT: 180 LBS | SYSTOLIC BLOOD PRESSURE: 114 MMHG
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
## 2025-02-21 PROCEDURE — 99214 OFFICE O/P EST MOD 30 MIN: CPT

## 2025-02-24 ENCOUNTER — APPOINTMENT (OUTPATIENT)
Dept: NEUROSURGERY | Facility: CLINIC | Age: 35
End: 2025-02-24
Payer: MEDICAID

## 2025-02-24 VITALS
OXYGEN SATURATION: 98 % | SYSTOLIC BLOOD PRESSURE: 111 MMHG | WEIGHT: 180 LBS | HEART RATE: 82 BPM | HEIGHT: 66 IN | TEMPERATURE: 97.9 F | DIASTOLIC BLOOD PRESSURE: 74 MMHG | BODY MASS INDEX: 28.93 KG/M2

## 2025-02-24 DIAGNOSIS — S02.91XA UNSPECIFIED FRACTURE OF SKULL, INITIAL ENCOUNTER FOR CLOSED FRACTURE: ICD-10-CM

## 2025-02-24 PROCEDURE — 99214 OFFICE O/P EST MOD 30 MIN: CPT

## 2025-02-25 ENCOUNTER — INPATIENT (INPATIENT)
Facility: HOSPITAL | Age: 35
LOS: 2 days | Discharge: HOME | DRG: 998 | End: 2025-02-28
Attending: NEUROLOGICAL SURGERY | Admitting: NEUROLOGICAL SURGERY
Payer: MEDICAID

## 2025-02-25 VITALS
SYSTOLIC BLOOD PRESSURE: 111 MMHG | HEART RATE: 63 BPM | TEMPERATURE: 98 F | WEIGHT: 179.9 LBS | HEIGHT: 66 IN | OXYGEN SATURATION: 98 % | DIASTOLIC BLOOD PRESSURE: 71 MMHG | RESPIRATION RATE: 18 BRPM

## 2025-02-25 DIAGNOSIS — Z98.890 OTHER SPECIFIED POSTPROCEDURAL STATES: Chronic | ICD-10-CM

## 2025-02-25 DIAGNOSIS — S02.19: ICD-10-CM

## 2025-02-25 PROBLEM — S02.91XA DEPRESSED SKULL FRACTURE: Status: ACTIVE | Noted: 2025-02-21

## 2025-02-25 LAB
A1C WITH ESTIMATED AVERAGE GLUCOSE RESULT: 5.3 % — SIGNIFICANT CHANGE UP (ref 4–5.6)
ALBUMIN SERPL ELPH-MCNC: 4.4 G/DL — SIGNIFICANT CHANGE UP (ref 3.3–5.2)
ALP SERPL-CCNC: 78 U/L — SIGNIFICANT CHANGE UP (ref 40–120)
ALT FLD-CCNC: 19 U/L — SIGNIFICANT CHANGE UP
ANION GAP SERPL CALC-SCNC: 10 MMOL/L — SIGNIFICANT CHANGE UP (ref 5–17)
APTT BLD: 36.4 SEC — HIGH (ref 24.5–35.6)
AST SERPL-CCNC: 20 U/L — SIGNIFICANT CHANGE UP
BASOPHILS # BLD AUTO: 0.03 K/UL — SIGNIFICANT CHANGE UP (ref 0–0.2)
BASOPHILS NFR BLD AUTO: 0.7 % — SIGNIFICANT CHANGE UP (ref 0–2)
BILIRUB SERPL-MCNC: 0.5 MG/DL — SIGNIFICANT CHANGE UP (ref 0.4–2)
BLD GP AB SCN SERPL QL: SIGNIFICANT CHANGE UP
BUN SERPL-MCNC: 12.9 MG/DL — SIGNIFICANT CHANGE UP (ref 8–20)
CALCIUM SERPL-MCNC: 9.2 MG/DL — SIGNIFICANT CHANGE UP (ref 8.4–10.5)
CHLORIDE SERPL-SCNC: 100 MMOL/L — SIGNIFICANT CHANGE UP (ref 96–108)
CO2 SERPL-SCNC: 28 MMOL/L — SIGNIFICANT CHANGE UP (ref 22–29)
CREAT SERPL-MCNC: 1.05 MG/DL — SIGNIFICANT CHANGE UP (ref 0.5–1.3)
EGFR: 96 ML/MIN/1.73M2 — SIGNIFICANT CHANGE UP
EOSINOPHIL # BLD AUTO: 0.23 K/UL — SIGNIFICANT CHANGE UP (ref 0–0.5)
EOSINOPHIL NFR BLD AUTO: 5.6 % — SIGNIFICANT CHANGE UP (ref 0–6)
ESTIMATED AVERAGE GLUCOSE: 105 MG/DL — SIGNIFICANT CHANGE UP (ref 68–114)
GLUCOSE SERPL-MCNC: 79 MG/DL — SIGNIFICANT CHANGE UP (ref 70–99)
HCT VFR BLD CALC: 45.8 % — SIGNIFICANT CHANGE UP (ref 39–50)
HGB BLD-MCNC: 15.3 G/DL — SIGNIFICANT CHANGE UP (ref 13–17)
IMM GRANULOCYTES # BLD AUTO: 0.01 K/UL — SIGNIFICANT CHANGE UP (ref 0–0.07)
IMM GRANULOCYTES NFR BLD AUTO: 0.2 % — SIGNIFICANT CHANGE UP (ref 0–0.9)
INR BLD: 1.08 RATIO — SIGNIFICANT CHANGE UP (ref 0.85–1.16)
LYMPHOCYTES # BLD AUTO: 1.66 K/UL — SIGNIFICANT CHANGE UP (ref 1–3.3)
LYMPHOCYTES NFR BLD AUTO: 40.4 % — SIGNIFICANT CHANGE UP (ref 13–44)
MAGNESIUM SERPL-MCNC: 2 MG/DL — SIGNIFICANT CHANGE UP (ref 1.6–2.6)
MCHC RBC-ENTMCNC: 29.8 PG — SIGNIFICANT CHANGE UP (ref 27–34)
MCHC RBC-ENTMCNC: 33.4 G/DL — SIGNIFICANT CHANGE UP (ref 32–36)
MCV RBC AUTO: 89.3 FL — SIGNIFICANT CHANGE UP (ref 80–100)
MONOCYTES # BLD AUTO: 0.33 K/UL — SIGNIFICANT CHANGE UP (ref 0–0.9)
MONOCYTES NFR BLD AUTO: 8 % — SIGNIFICANT CHANGE UP (ref 2–14)
MRSA PCR RESULT.: SIGNIFICANT CHANGE UP
NEUTROPHILS # BLD AUTO: 1.85 K/UL — SIGNIFICANT CHANGE UP (ref 1.8–7.4)
NEUTROPHILS NFR BLD AUTO: 45.1 % — SIGNIFICANT CHANGE UP (ref 43–77)
NRBC # BLD AUTO: 0 K/UL — SIGNIFICANT CHANGE UP (ref 0–0)
NRBC # FLD: 0 K/UL — SIGNIFICANT CHANGE UP (ref 0–0)
NRBC BLD AUTO-RTO: 0 /100 WBCS — SIGNIFICANT CHANGE UP (ref 0–0)
PHOSPHATE SERPL-MCNC: 3.2 MG/DL — SIGNIFICANT CHANGE UP (ref 2.4–4.7)
PLATELET # BLD AUTO: 247 K/UL — SIGNIFICANT CHANGE UP (ref 150–400)
PMV BLD: 10.4 FL — SIGNIFICANT CHANGE UP (ref 7–13)
POTASSIUM SERPL-MCNC: 4.5 MMOL/L — SIGNIFICANT CHANGE UP (ref 3.5–5.3)
POTASSIUM SERPL-SCNC: 4.5 MMOL/L — SIGNIFICANT CHANGE UP (ref 3.5–5.3)
PROT SERPL-MCNC: 7.1 G/DL — SIGNIFICANT CHANGE UP (ref 6.6–8.7)
PROTHROM AB SERPL-ACNC: 12.5 SEC — SIGNIFICANT CHANGE UP (ref 9.9–13.4)
RBC # BLD: 5.13 M/UL — SIGNIFICANT CHANGE UP (ref 4.2–5.8)
RBC # FLD: 11.6 % — SIGNIFICANT CHANGE UP (ref 10.3–14.5)
S AUREUS DNA NOSE QL NAA+PROBE: SIGNIFICANT CHANGE UP
SODIUM SERPL-SCNC: 138 MMOL/L — SIGNIFICANT CHANGE UP (ref 135–145)
TSH SERPL-MCNC: 1.17 UIU/ML — SIGNIFICANT CHANGE UP (ref 0.27–4.2)
WBC # BLD: 4.11 K/UL — SIGNIFICANT CHANGE UP (ref 3.8–10.5)
WBC # FLD AUTO: 4.11 K/UL — SIGNIFICANT CHANGE UP (ref 3.8–10.5)

## 2025-02-25 PROCEDURE — 99222 1ST HOSP IP/OBS MODERATE 55: CPT

## 2025-02-25 PROCEDURE — 93010 ELECTROCARDIOGRAM REPORT: CPT

## 2025-02-25 PROCEDURE — 70450 CT HEAD/BRAIN W/O DYE: CPT | Mod: 26

## 2025-02-25 PROCEDURE — 71045 X-RAY EXAM CHEST 1 VIEW: CPT | Mod: 26

## 2025-02-25 PROCEDURE — 99223 1ST HOSP IP/OBS HIGH 75: CPT | Mod: 57

## 2025-02-25 RX ORDER — POVIDONE-IODINE 7.5 %
1 SOLUTION, NON-ORAL TOPICAL ONCE
Refills: 0 | Status: COMPLETED | OUTPATIENT
Start: 2025-02-26 | End: 2025-02-26

## 2025-02-25 RX ORDER — CEFAZOLIN SODIUM IN 0.9 % NACL 3 G/100 ML
2000 INTRAVENOUS SOLUTION, PIGGYBACK (ML) INTRAVENOUS ONCE
Refills: 0 | Status: DISCONTINUED | OUTPATIENT
Start: 2025-02-26 | End: 2025-02-26

## 2025-02-25 RX ORDER — ACETAMINOPHEN 500 MG/5ML
650 LIQUID (ML) ORAL EVERY 6 HOURS
Refills: 0 | Status: DISCONTINUED | OUTPATIENT
Start: 2025-02-25 | End: 2025-02-26

## 2025-02-25 RX ORDER — MELATONIN 5 MG
3 TABLET ORAL AT BEDTIME
Refills: 0 | Status: DISCONTINUED | OUTPATIENT
Start: 2025-02-25 | End: 2025-02-26

## 2025-02-25 RX ORDER — SODIUM CHLORIDE 9 G/1000ML
1000 INJECTION, SOLUTION INTRAVENOUS
Refills: 0 | Status: DISCONTINUED | OUTPATIENT
Start: 2025-02-26 | End: 2025-02-26

## 2025-02-25 RX ORDER — INFLUENZA A VIRUS A/IDAHO/07/2018 (H1N1) ANTIGEN (MDCK CELL DERIVED, PROPIOLACTONE INACTIVATED, INFLUENZA A VIRUS A/INDIANA/08/2018 (H3N2) ANTIGEN (MDCK CELL DERIVED, PROPIOLACTONE INACTIVATED), INFLUENZA B VIRUS B/SINGAPORE/INFTT-16-0610/2016 ANTIGEN (MDCK CELL DERIVED, PROPIOLACTONE INACTIVATED), INFLUENZA B VIRUS B/IOWA/06/2017 ANTIGEN (MDCK CELL DERIVED, PROPIOLACTONE INACTIVATED) 15; 15; 15; 15 UG/.5ML; UG/.5ML; UG/.5ML; UG/.5ML
0.5 INJECTION, SUSPENSION INTRAMUSCULAR ONCE
Refills: 0 | Status: DISCONTINUED | OUTPATIENT
Start: 2025-02-25 | End: 2025-02-28

## 2025-02-25 RX ORDER — SENNA 187 MG
2 TABLET ORAL AT BEDTIME
Refills: 0 | Status: DISCONTINUED | OUTPATIENT
Start: 2025-02-25 | End: 2025-02-26

## 2025-02-25 RX ORDER — POLYETHYLENE GLYCOL 3350 17 G/17G
17 POWDER, FOR SOLUTION ORAL DAILY
Refills: 0 | Status: DISCONTINUED | OUTPATIENT
Start: 2025-02-25 | End: 2025-02-26

## 2025-02-25 RX ADMIN — Medication 1 APPLICATION(S): at 22:49

## 2025-02-25 RX ADMIN — Medication 3 MILLIGRAM(S): at 22:46

## 2025-02-25 NOTE — H&P ADULT - NS ATTEND AMEND GEN_ALL_CORE FT
NSGY Attg;    see above    patient seen and examined    apparent midline forehead bony defect  PERRL  EOMI  FS grossly  FRANKS to command  5/5    risk, benefits, and alternatives of surgery discussed using a licensed  as below:    benefit: hopeful improvement in cosmetic appearance/improved protection, hopeful prevention of progression of deficit   alternative: no surgical intervention; continued surveillance  risks: bleeding, infection, CSF leak, failure of procedure, need for re-operation, seizure, stroke, coma, death, DVT, PE, MI, PNA, UTI, difficulty/failure to intubate or extubate, new or worsening numbness, tingling, weakness, paralysis, sensory changes, difficulty/inability to ambulate, difficulty with expressive or receptive speech, altered personality or judgment, sexual dysfunction, incontinence    I have answered all of the patient's questions.  He wishes to proceed with operative intervention and understands that cosmetic improvement of the natural lines and contours of the forehead that are pre-existing and not associated with the fracture defect is not the purpose of this operative intervention.    plan of care determined for comminuted fracture of anterior wall of frontal sinus  pre-op for OR 2/26    this is a high risk case; imaging reviewed by multiple qualified providers who are in agreement
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
## 2025-02-25 NOTE — PATIENT PROFILE ADULT - FALL HARM RISK - UNIVERSAL INTERVENTIONS
Bed in lowest position, wheels locked, appropriate side rails in place/Call bell, personal items and telephone in reach/Instruct patient to call for assistance before getting out of bed or chair/Non-slip footwear when patient is out of bed/[place] to call system/Physically safe environment - no spills, clutter or unnecessary equipment/Purposeful Proactive Rounding/Room/bathroom lighting operational, light cord in reach
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
## 2025-02-25 NOTE — H&P ADULT - NSICDXPASTMEDICALHX_GEN_ALL_CORE_FT
PAST MEDICAL HISTORY:  Depressed skull fracture     Hyperlipidemia no medication    Obesity, morbid, BMI 40.0-49.9

## 2025-02-25 NOTE — PATIENT PROFILE ADULT - NSTOBACCONEVERSMOKERY/N_GEN_A
----- Message from Nubia Arce sent at 6/12/2019 11:00 PM PDT -----  Regarding: RE: Non-Urgent Medical Question  Contact: [phone]  Hello,    I spoke to a Zuly late this afternoon and she encouraged me to be seen at an urgent care soon. So I immediately went to one in [place], [place] around 5p tonight the 12th. They confirmed a UTI, but said they weren’t able to confirm the E. Coli so sent it for a further culture. She prescribed a 7-day antibiotic and some type of anti-inflammatory that targets the bladder. As of 9pm [facility] did not have it filled. This evening I started having significant bleeding and more pain when I pee. I hope to get a notification front [facility] in the morning that it has been filled. Fingers crossed it kicks in quickly. Zuly asked for an update, but said she would be out on Thursday.     Thank you,  Nubia  ----- Message -----  From: Cait Hampton R.N.  Sent: 6/11/2019  8:54 AM PDT  To: Nubia Arce  Subject: RE: Non-Urgent Medical Question  Devon Delacruz,     I'm sorry to hear you're feeling bad. It does sound like a UTI. Typically it is best to test a urine sample to check for infection so we can give you the best treatment. Is there an urgent care that you could visit?    Cait    ----- Message -----     From: Nubia Arce     Sent: 6/11/2019  7:56 AM PDT       To: Yessica Duke M.D.  Subject: Non-Urgent Medical Question    Good morning Dr. Duke,  I am out of town visiting family in the [place] area. Last night I woke up to go to the bathroom and peeing was a bit painful. Now I am having the constant urgency to pee even though there’s not a lot there. It is still burning when I go too. I don’t think I’ve ever had a UTI, but it sure feels a lot like one. (My  and I had intercourse two nights ago, so maybe that is what caused it). I won’t be back in [place] until Friday. What do you recommend I do? I’ve been trying to drink a ton of water in the meantime.      Thanks in advance!   Nubia Arce     No
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
## 2025-02-25 NOTE — H&P ADULT - NSHPPHYSICALEXAM_GEN_ALL_CORE
GEN: NAD  HEAD: depressed area mid frontal/forehead region  MENTAL STATUS: Awake, alert, oriented to self, hospital, full date. Following commands. No aphasia  CRANIAL NERVES: PERRL. EOMI. Facial sensation intact. Face symmetric. Hearing intact. Speech clear  MOTOR: 5/5 all extremities  SENSATION: Grossly intact to light touch all extremities  PULM: Nonlabored  ABDOMEN: Soft  EXTREMITIES: nontender
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
## 2025-02-26 ENCOUNTER — APPOINTMENT (OUTPATIENT)
Dept: NEUROSURGERY | Facility: HOSPITAL | Age: 35
End: 2025-02-26

## 2025-02-26 ENCOUNTER — TRANSCRIPTION ENCOUNTER (OUTPATIENT)
Age: 35
End: 2025-02-26

## 2025-02-26 LAB
ANION GAP SERPL CALC-SCNC: 10 MMOL/L — SIGNIFICANT CHANGE UP (ref 5–17)
ANION GAP SERPL CALC-SCNC: 13 MMOL/L — SIGNIFICANT CHANGE UP (ref 5–17)
BUN SERPL-MCNC: 13.6 MG/DL — SIGNIFICANT CHANGE UP (ref 8–20)
BUN SERPL-MCNC: 15.5 MG/DL — SIGNIFICANT CHANGE UP (ref 8–20)
CALCIUM SERPL-MCNC: 8.5 MG/DL — SIGNIFICANT CHANGE UP (ref 8.4–10.5)
CALCIUM SERPL-MCNC: 9 MG/DL — SIGNIFICANT CHANGE UP (ref 8.4–10.5)
CHLORIDE SERPL-SCNC: 101 MMOL/L — SIGNIFICANT CHANGE UP (ref 96–108)
CHLORIDE SERPL-SCNC: 101 MMOL/L — SIGNIFICANT CHANGE UP (ref 96–108)
CO2 SERPL-SCNC: 22 MMOL/L — SIGNIFICANT CHANGE UP (ref 22–29)
CO2 SERPL-SCNC: 27 MMOL/L — SIGNIFICANT CHANGE UP (ref 22–29)
CREAT SERPL-MCNC: 1.09 MG/DL — SIGNIFICANT CHANGE UP (ref 0.5–1.3)
CREAT SERPL-MCNC: 1.17 MG/DL — SIGNIFICANT CHANGE UP (ref 0.5–1.3)
EGFR: 84 ML/MIN/1.73M2 — SIGNIFICANT CHANGE UP
EGFR: 92 ML/MIN/1.73M2 — SIGNIFICANT CHANGE UP
GLUCOSE SERPL-MCNC: 133 MG/DL — HIGH (ref 70–99)
GLUCOSE SERPL-MCNC: 84 MG/DL — SIGNIFICANT CHANGE UP (ref 70–99)
HCT VFR BLD CALC: 41.2 % — SIGNIFICANT CHANGE UP (ref 39–50)
HCT VFR BLD CALC: 42.8 % — SIGNIFICANT CHANGE UP (ref 39–50)
HGB BLD-MCNC: 13.6 G/DL — SIGNIFICANT CHANGE UP (ref 13–17)
HGB BLD-MCNC: 14.4 G/DL — SIGNIFICANT CHANGE UP (ref 13–17)
MAGNESIUM SERPL-MCNC: 2 MG/DL — SIGNIFICANT CHANGE UP (ref 1.6–2.6)
MAGNESIUM SERPL-MCNC: 2.1 MG/DL — SIGNIFICANT CHANGE UP (ref 1.6–2.6)
MCHC RBC-ENTMCNC: 29.6 PG — SIGNIFICANT CHANGE UP (ref 27–34)
MCHC RBC-ENTMCNC: 30 PG — SIGNIFICANT CHANGE UP (ref 27–34)
MCHC RBC-ENTMCNC: 33 G/DL — SIGNIFICANT CHANGE UP (ref 32–36)
MCHC RBC-ENTMCNC: 33.6 G/DL — SIGNIFICANT CHANGE UP (ref 32–36)
MCV RBC AUTO: 89.2 FL — SIGNIFICANT CHANGE UP (ref 80–100)
MCV RBC AUTO: 89.6 FL — SIGNIFICANT CHANGE UP (ref 80–100)
MRSA PCR RESULT.: SIGNIFICANT CHANGE UP
NRBC # BLD AUTO: 0 K/UL — SIGNIFICANT CHANGE UP (ref 0–0)
NRBC # BLD AUTO: 0 K/UL — SIGNIFICANT CHANGE UP (ref 0–0)
NRBC # FLD: 0 K/UL — SIGNIFICANT CHANGE UP (ref 0–0)
NRBC # FLD: 0 K/UL — SIGNIFICANT CHANGE UP (ref 0–0)
NRBC BLD AUTO-RTO: 0 /100 WBCS — SIGNIFICANT CHANGE UP (ref 0–0)
NRBC BLD AUTO-RTO: 0 /100 WBCS — SIGNIFICANT CHANGE UP (ref 0–0)
PHOSPHATE SERPL-MCNC: 3.4 MG/DL — SIGNIFICANT CHANGE UP (ref 2.4–4.7)
PHOSPHATE SERPL-MCNC: 3.8 MG/DL — SIGNIFICANT CHANGE UP (ref 2.4–4.7)
PLATELET # BLD AUTO: 236 K/UL — SIGNIFICANT CHANGE UP (ref 150–400)
PLATELET # BLD AUTO: 281 K/UL — SIGNIFICANT CHANGE UP (ref 150–400)
PMV BLD: 10.5 FL — SIGNIFICANT CHANGE UP (ref 7–13)
PMV BLD: 10.5 FL — SIGNIFICANT CHANGE UP (ref 7–13)
POTASSIUM SERPL-MCNC: 4.1 MMOL/L — SIGNIFICANT CHANGE UP (ref 3.5–5.3)
POTASSIUM SERPL-MCNC: 4.4 MMOL/L — SIGNIFICANT CHANGE UP (ref 3.5–5.3)
POTASSIUM SERPL-SCNC: 4.1 MMOL/L — SIGNIFICANT CHANGE UP (ref 3.5–5.3)
POTASSIUM SERPL-SCNC: 4.4 MMOL/L — SIGNIFICANT CHANGE UP (ref 3.5–5.3)
RBC # BLD: 4.6 M/UL — SIGNIFICANT CHANGE UP (ref 4.2–5.8)
RBC # BLD: 4.8 M/UL — SIGNIFICANT CHANGE UP (ref 4.2–5.8)
RBC # FLD: 11.3 % — SIGNIFICANT CHANGE UP (ref 10.3–14.5)
RBC # FLD: 11.5 % — SIGNIFICANT CHANGE UP (ref 10.3–14.5)
S AUREUS DNA NOSE QL NAA+PROBE: SIGNIFICANT CHANGE UP
SODIUM SERPL-SCNC: 135 MMOL/L — SIGNIFICANT CHANGE UP (ref 135–145)
SODIUM SERPL-SCNC: 138 MMOL/L — SIGNIFICANT CHANGE UP (ref 135–145)
WBC # BLD: 12.99 K/UL — HIGH (ref 3.8–10.5)
WBC # BLD: 5.04 K/UL — SIGNIFICANT CHANGE UP (ref 3.8–10.5)
WBC # FLD AUTO: 12.99 K/UL — HIGH (ref 3.8–10.5)
WBC # FLD AUTO: 5.04 K/UL — SIGNIFICANT CHANGE UP (ref 3.8–10.5)

## 2025-02-26 PROCEDURE — 99221 1ST HOSP IP/OBS SF/LOW 40: CPT

## 2025-02-26 PROCEDURE — 93970 EXTREMITY STUDY: CPT | Mod: 26

## 2025-02-26 PROCEDURE — 70450 CT HEAD/BRAIN W/O DYE: CPT | Mod: 26

## 2025-02-26 PROCEDURE — 62140 CRNOP SKULL DEFECT<5 CM DIAM: CPT

## 2025-02-26 PROCEDURE — 62140 CRNOP SKULL DEFECT<5 CM DIAM: CPT | Mod: AS

## 2025-02-26 PROCEDURE — 21344 OPEN TX COMPL FRONT SINUS FX: CPT

## 2025-02-26 PROCEDURE — 21344 OPEN TX COMPL FRONT SINUS FX: CPT | Mod: AS

## 2025-02-26 PROCEDURE — 99232 SBSQ HOSP IP/OBS MODERATE 35: CPT

## 2025-02-26 DEVICE — SCREW 1.5X4: Type: IMPLANTABLE DEVICE | Status: FUNCTIONAL

## 2025-02-26 DEVICE — SURGIFOAM 8 X 12.5CM X 10MM (100): Type: IMPLANTABLE DEVICE | Status: FUNCTIONAL

## 2025-02-26 DEVICE — IMPLANTABLE DEVICE: Type: IMPLANTABLE DEVICE | Status: FUNCTIONAL

## 2025-02-26 DEVICE — MAYFIELD SKULL PIN ADULT STEEL: Type: IMPLANTABLE DEVICE | Status: FUNCTIONAL

## 2025-02-26 DEVICE — SCREW EMERGENCY 4MM: Type: IMPLANTABLE DEVICE | Status: FUNCTIONAL

## 2025-02-26 DEVICE — SURGIFLO MATRIX WITH THROMBIN KIT: Type: IMPLANTABLE DEVICE | Status: FUNCTIONAL

## 2025-02-26 RX ORDER — ACETAMINOPHEN 500 MG/5ML
1000 LIQUID (ML) ORAL ONCE
Refills: 0 | Status: COMPLETED | OUTPATIENT
Start: 2025-02-26 | End: 2025-02-26

## 2025-02-26 RX ORDER — ENOXAPARIN SODIUM 100 MG/ML
40 INJECTION SUBCUTANEOUS EVERY 24 HOURS
Refills: 0 | Status: DISCONTINUED | OUTPATIENT
Start: 2025-02-26 | End: 2025-02-26

## 2025-02-26 RX ORDER — CEFAZOLIN SODIUM IN 0.9 % NACL 3 G/100 ML
2000 INTRAVENOUS SOLUTION, PIGGYBACK (ML) INTRAVENOUS EVERY 8 HOURS
Refills: 0 | Status: DISCONTINUED | OUTPATIENT
Start: 2025-02-26 | End: 2025-02-26

## 2025-02-26 RX ORDER — ACETAMINOPHEN 500 MG/5ML
650 LIQUID (ML) ORAL EVERY 6 HOURS
Refills: 0 | Status: DISCONTINUED | OUTPATIENT
Start: 2025-02-26 | End: 2025-02-28

## 2025-02-26 RX ORDER — MELATONIN 5 MG
3 TABLET ORAL AT BEDTIME
Refills: 0 | Status: DISCONTINUED | OUTPATIENT
Start: 2025-02-26 | End: 2025-02-28

## 2025-02-26 RX ORDER — OXYCODONE HYDROCHLORIDE 30 MG/1
5 TABLET ORAL EVERY 6 HOURS
Refills: 0 | Status: DISCONTINUED | OUTPATIENT
Start: 2025-02-26 | End: 2025-02-27

## 2025-02-26 RX ORDER — POLYETHYLENE GLYCOL 3350 17 G/17G
17 POWDER, FOR SOLUTION ORAL DAILY
Refills: 0 | Status: DISCONTINUED | OUTPATIENT
Start: 2025-02-26 | End: 2025-02-27

## 2025-02-26 RX ORDER — SENNA 187 MG
2 TABLET ORAL AT BEDTIME
Refills: 0 | Status: DISCONTINUED | OUTPATIENT
Start: 2025-02-26 | End: 2025-02-27

## 2025-02-26 RX ORDER — ONDANSETRON HCL/PF 4 MG/2 ML
4 VIAL (ML) INJECTION EVERY 6 HOURS
Refills: 0 | Status: DISCONTINUED | OUTPATIENT
Start: 2025-02-26 | End: 2025-02-27

## 2025-02-26 RX ORDER — OXYCODONE HYDROCHLORIDE 30 MG/1
10 TABLET ORAL EVERY 6 HOURS
Refills: 0 | Status: DISCONTINUED | OUTPATIENT
Start: 2025-02-26 | End: 2025-02-27

## 2025-02-26 RX ORDER — CEFAZOLIN SODIUM IN 0.9 % NACL 3 G/100 ML
2000 INTRAVENOUS SOLUTION, PIGGYBACK (ML) INTRAVENOUS EVERY 8 HOURS
Refills: 0 | Status: DISCONTINUED | OUTPATIENT
Start: 2025-02-26 | End: 2025-02-27

## 2025-02-26 RX ADMIN — Medication 1000 MILLIGRAM(S): at 21:00

## 2025-02-26 RX ADMIN — Medication 400 MILLIGRAM(S): at 20:10

## 2025-02-26 RX ADMIN — Medication 1 APPLICATION(S): at 08:09

## 2025-02-26 RX ADMIN — Medication 2000 MILLIGRAM(S): at 22:04

## 2025-02-26 RX ADMIN — OXYCODONE HYDROCHLORIDE 5 MILLIGRAM(S): 30 TABLET ORAL at 17:00

## 2025-02-26 RX ADMIN — Medication 400 MILLIGRAM(S): at 14:15

## 2025-02-26 RX ADMIN — Medication 1 APPLICATION(S): at 09:28

## 2025-02-26 RX ADMIN — Medication 3 MILLIGRAM(S): at 22:04

## 2025-02-26 RX ADMIN — OXYCODONE HYDROCHLORIDE 5 MILLIGRAM(S): 30 TABLET ORAL at 16:01

## 2025-02-26 RX ADMIN — SODIUM CHLORIDE 75 MILLILITER(S): 9 INJECTION, SOLUTION INTRAVENOUS at 08:14

## 2025-02-26 RX ADMIN — Medication 60 MILLILITER(S): at 14:39

## 2025-02-26 RX ADMIN — Medication 1000 MILLIGRAM(S): at 14:45

## 2025-02-26 NOTE — ASU PREOP CHECKLIST - STERILIZATION AFFIRMATION
well developed, well nourished , in no acute distress , ambulating without difficulty , normal communication ability ,  n/a
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
## 2025-02-26 NOTE — PROGRESS NOTE ADULT - SUBJECTIVE AND OBJECTIVE BOX
NSGY Attg:    Patient seen and examined.  No acute overnight events reported.    Exam:  A and O x 3  conversant in [place]  cosmetic deformity secondary to depressed anterior table midline frontal sinus fracture apparent without surrounding skin compromise  PERRL  EOMI  FS grossly  FRANKS to command  5/5    the patient is declining re-explanation of the risks, benefits, and alternatives of operative intervention as previously discussed and documented; I have answered all of his additional questions, including those related to the incision    plan of care determined for frontal sinus fracture  - to OR
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
## 2025-02-26 NOTE — PROGRESS NOTE ADULT - SUBJECTIVE AND OBJECTIVE BOX
TAMMY SALDAÑA    [id number]    34y      Male    Patient is a 34y old  Male who presents with a chief complaint of Depressed frontal skull/sinus fracture (26 Feb 2025 10:10)      INTERVAL HPI/OVERNIGHT EVENTS:    patient is doing ok, denies fever, chills, chest pain, has no headache, weakness and numbness      Vital Signs Last 24 Hrs  T(C): 36.6 (26 Feb 2025 09:19), Max: 37.1 (25 Feb 2025 15:27)  T(F): 97.9 (26 Feb 2025 09:19), Max: 98.8 (25 Feb 2025 15:27)  HR: 60 (26 Feb 2025 09:19) (59 - 73)  BP: 108/68 (26 Feb 2025 09:19) (103/66 - 127/73)  BP(mean): 84 (25 Feb 2025 11:20) (84 - 84)  RR: 16 (26 Feb 2025 09:19) (16 - 18)  SpO2: 100% (26 Feb 2025 09:19) (95% - 100%)    Parameters below as of 26 Feb 2025 07:58  Patient On (Oxygen Delivery Method): room air        PHYSICAL EXAM:    GENERAL: Young male looking comfortable    HEENT: Frontal bone is depressed   NECK: soft, Supple, No JVD  CHEST/LUNG: Clear to auscultate bilaterally; No wheezing  HEART: S1S2+, Regular rate and rhythm; No murmurs  ABDOMEN: Soft, Nontender, Nondistended; Bowel sounds present  EXTREMITIES:  1+ Peripheral Pulses, No edema  SKIN: No rashes or lesions  NEURO: AAOX3  PSYCH: normal mood      LABS:                        14.4   5.04  )-----------( 236      ( 26 Feb 2025 05:40 )             42.8     02-26    138  |  101  |  15.5  ----------------------------<  84  4.4   |  27.0  |  1.17    Ca    9.0      26 Feb 2025 05:40  Phos  3.8     02-26  Mg     2.1     02-26    TPro  7.1  /  Alb  4.4  /  TBili  0.5  /  DBili  x   /  AST  20  /  ALT  19  /  AlkPhos  78  02-25    PT/INR - ( 25 Feb 2025 10:50 )   PT: 12.5 sec;   INR: 1.08 ratio         PTT - ( 25 Feb 2025 10:50 )  PTT:36.4 sec      I&O's Summary    25 Feb 2025 07:01  -  26 Feb 2025 07:00  --------------------------------------------------------  IN: 800 mL / OUT: 0 mL / NET: 800 mL        MEDICATIONS  (STANDING):  ceFAZolin  Injectable. 2000 milliGRAM(s) IV Push once  influenza   Vaccine 0.5 milliLiter(s) IntraMuscular once  lactated ringers. 1000 milliLiter(s) (75 mL/Hr) IV Continuous <Continuous>  polyethylene glycol 3350 17 Gram(s) Oral daily  senna 2 Tablet(s) Oral at bedtime    MEDICATIONS  (PRN):  acetaminophen     Tablet .. 650 milliGRAM(s) Oral every 6 hours PRN Temp greater or equal to 38.5C (101.3F), Mild Pain (1 - 3)  melatonin 3 milliGRAM(s) Oral at bedtime PRN Insomnia
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
## 2025-02-26 NOTE — PRE-OP CHECKLIST - ISOLATION PRECAUTIONS
Renal Medicine Progress Note                                Radha Turciso MRN# [id number]   Age: 59 year old YOB: 1960   Date of Admission: 10/7/2019 Hospital LOS: 2                  Assessment/Plan:     59-year-old male admitted via the emergency room dated 10/06/2019 in the setting of altered mental status.     Evaluated with respect to apparent acute kidney injury in the setting of advanced liver disease.     1.  Baseline normal renal function   -creatinine 06/19/19 0.54 mg/dl  2.  ARF   -oliguric   -Rose 9   -pre renal v HRS (improving with hydration)  3.  Dipstick proteinuria   -ACR pending  4.  Hypokalemia   5.  Metabolic alkalosis   -improved  6.  Hypoalbuminemia  7.  Cirrhosis   -portal hypertension   -encephalopathy  8.  Hypernatremia       Renal function improving  Increasing Na    Increase free water as indicated (increased to 200 q4)  No diuretic at this time     Call with questions  Signing off         Interval History:     Answers questions today  Follows  Complaining of thirst         ROS:     ROS unable      Medications and Allergies:     Reviewed    Physical Exam:     Vitals were reviewed  Patient Vitals for the past 8 hrs:   BP Temp Pulse Heart Rate Resp SpO2   10/09/19 1015 115/63 -- 108 107 23 96 %   10/09/19 0835 122/70 -- 109 110 24 97 %   10/09/19 0804 -- 97.8  F (36.6  C) -- -- -- --   10/09/19 0600 109/65 -- 109 109 (!) 34 96 %   10/09/19 0400 107/61 -- 103 103 -- 94 %     I/O last 3 completed shifts:  In: 841 [NG/GT:841]  Out: 1055 [Urine:1055]    Vitals:    10/08/19 0400   Weight: 66.1 kg (145 lb 11.2 oz)         GENERAL: uncooperative  HEENT: nose and mouth without ulcers or lesions  RESP:  clear anteriorly  CV: RRR, normal S1 S2  ABDOMEN: distended  MS: no clubbing, cyanosis   SKIN: clear without significant rashes or lesions  NEURO: speech normal and cranial nerves 2-12 intact  PSYCH: affect flat and confused  EXT: warm, no edema    Data:     Recent Labs   Lab  10/09/19  0537 10/08/19  1257 10/08/19  0532 10/07/19  1412   * 140 138 138   POTASSIUM 3.5 4.6 2.5* 3.8   CHLORIDE 114* 113* 106 108   CO2 23 21 22 24   ANIONGAP 8 6 10 6   * 92 98 298*   BUN 30 39* 44* 44*   CR 2.14* 2.79* 3.26* 3.70*   GFRESTIMATED 25* 18* 15* 13*   GFRESTBLACK 29* 21* 17* 15*   KARLEY 8.6 8.5 8.1* 7.1*         Recent Labs   Lab 10/09/19  0537 10/08/19  1257 10/08/19  0532 10/07/19  1412 10/07/19  1127 10/07/19  0612 10/06/19  1852   CR 2.14* 2.79* 3.26* 3.70* 4.04* 4.12* 4.26*     Recent Labs   Lab 10/07/19  0612 10/06/19  1852   ALBUMIN 2.1* 1.8*     Recent Labs   Lab 10/09/19  0537 10/08/19  1257 10/08/19  0532 10/07/19  2349   PHOS 2.4*  --  4.4  --    HGB 8.2* 8.3* 8.1* 8.2*     Recent Labs   Lab 10/07/19  2220   COLOR Yellow   APPEARANCE Slightly Cloudy   URINEGLC Negative   URINEBILI Negative   URINEKETONE Negative   SG 1.015   UBLD Moderate*   URINEPH 5.5   PROTEIN 30*   NITRITE Negative   LEUKEST Moderate*   RBCU 32*   WBCU 40*     Recent Labs   Lab 10/07/19  0702   UNAR 9         G Paul Figueroa MD    [facility] - Nephrology  [phone]   none

## 2025-02-26 NOTE — ASU PREOP CHECKLIST - SPO2 (%)
Caller: La Nagel    Relationship: Self    Best call back number: [phone]    Caller requesting test results: PATIENT    What test was performed: XRAYS AND LABS    When was the test performed: 07/02    Where was the test performed: IN OFFICE    Additional notes: PATIENT CALLING TO RECEIVE THE RESULTS OF HER XRAYS AND LABS           
I spoke with the patient.  Her labs and xray do not suggest a reason for the cough.  She says that the Albuterol helps with the cough.  I offered a maintenance inhaler.  She does not want a mantenance inhaler at this time.  
100
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
## 2025-02-27 ENCOUNTER — TRANSCRIPTION ENCOUNTER (OUTPATIENT)
Age: 35
End: 2025-02-27

## 2025-02-27 LAB
ANION GAP SERPL CALC-SCNC: 9 MMOL/L — SIGNIFICANT CHANGE UP (ref 5–17)
BLD GP AB SCN SERPL QL: SIGNIFICANT CHANGE UP
BUN SERPL-MCNC: 10.6 MG/DL — SIGNIFICANT CHANGE UP (ref 8–20)
CALCIUM SERPL-MCNC: 8.2 MG/DL — LOW (ref 8.4–10.5)
CHLORIDE SERPL-SCNC: 107 MMOL/L — SIGNIFICANT CHANGE UP (ref 96–108)
CO2 SERPL-SCNC: 26 MMOL/L — SIGNIFICANT CHANGE UP (ref 22–29)
CREAT SERPL-MCNC: 1.11 MG/DL — SIGNIFICANT CHANGE UP (ref 0.5–1.3)
EGFR: 90 ML/MIN/1.73M2 — SIGNIFICANT CHANGE UP
GLUCOSE SERPL-MCNC: 95 MG/DL — SIGNIFICANT CHANGE UP (ref 70–99)
HCT VFR BLD CALC: 36 % — LOW (ref 39–50)
HGB BLD-MCNC: 12.4 G/DL — LOW (ref 13–17)
MAGNESIUM SERPL-MCNC: 1.9 MG/DL — SIGNIFICANT CHANGE UP (ref 1.6–2.6)
MCHC RBC-ENTMCNC: 30.4 PG — SIGNIFICANT CHANGE UP (ref 27–34)
MCHC RBC-ENTMCNC: 34.4 G/DL — SIGNIFICANT CHANGE UP (ref 32–36)
MCV RBC AUTO: 88.2 FL — SIGNIFICANT CHANGE UP (ref 80–100)
NRBC # BLD AUTO: 0 K/UL — SIGNIFICANT CHANGE UP (ref 0–0)
NRBC # FLD: 0 K/UL — SIGNIFICANT CHANGE UP (ref 0–0)
NRBC BLD AUTO-RTO: 0 /100 WBCS — SIGNIFICANT CHANGE UP (ref 0–0)
PHOSPHATE SERPL-MCNC: 3.6 MG/DL — SIGNIFICANT CHANGE UP (ref 2.4–4.7)
PLATELET # BLD AUTO: 237 K/UL — SIGNIFICANT CHANGE UP (ref 150–400)
PMV BLD: 10.5 FL — SIGNIFICANT CHANGE UP (ref 7–13)
POTASSIUM SERPL-MCNC: 4.3 MMOL/L — SIGNIFICANT CHANGE UP (ref 3.5–5.3)
POTASSIUM SERPL-SCNC: 4.3 MMOL/L — SIGNIFICANT CHANGE UP (ref 3.5–5.3)
RBC # BLD: 4.08 M/UL — LOW (ref 4.2–5.8)
RBC # FLD: 11.7 % — SIGNIFICANT CHANGE UP (ref 10.3–14.5)
SODIUM SERPL-SCNC: 142 MMOL/L — SIGNIFICANT CHANGE UP (ref 135–145)
WBC # BLD: 10.2 K/UL — SIGNIFICANT CHANGE UP (ref 3.8–10.5)
WBC # FLD AUTO: 10.2 K/UL — SIGNIFICANT CHANGE UP (ref 3.8–10.5)

## 2025-02-27 PROCEDURE — 20520 RMVL FB MUSC/TDN SIMPLE: CPT | Mod: 78

## 2025-02-27 PROCEDURE — 99232 SBSQ HOSP IP/OBS MODERATE 35: CPT

## 2025-02-27 DEVICE — ELCTR SPINAL KIT (6 CONTACTS): Type: IMPLANTABLE DEVICE | Site: MIDLINE | Status: FUNCTIONAL

## 2025-02-27 DEVICE — ELCTR SPINAL KIT (3 CONTACTS): Type: IMPLANTABLE DEVICE | Site: MIDLINE | Status: FUNCTIONAL

## 2025-02-27 RX ORDER — ONDANSETRON HCL/PF 4 MG/2 ML
4 VIAL (ML) INJECTION EVERY 6 HOURS
Refills: 0 | Status: DISCONTINUED | OUTPATIENT
Start: 2025-02-27 | End: 2025-02-28

## 2025-02-27 RX ORDER — OXYCODONE HYDROCHLORIDE 30 MG/1
10 TABLET ORAL EVERY 4 HOURS
Refills: 0 | Status: DISCONTINUED | OUTPATIENT
Start: 2025-02-27 | End: 2025-02-28

## 2025-02-27 RX ORDER — LABETALOL HYDROCHLORIDE 200 MG/1
10 TABLET, FILM COATED ORAL
Refills: 0 | Status: DISCONTINUED | OUTPATIENT
Start: 2025-02-27 | End: 2025-02-28

## 2025-02-27 RX ORDER — POLYETHYLENE GLYCOL 3350 17 G/17G
17 POWDER, FOR SOLUTION ORAL DAILY
Refills: 0 | Status: DISCONTINUED | OUTPATIENT
Start: 2025-02-27 | End: 2025-02-28

## 2025-02-27 RX ORDER — HYDROMORPHONE/SOD CHLOR,ISO/PF 2 MG/10 ML
0.5 SYRINGE (ML) INJECTION EVERY 6 HOURS
Refills: 0 | Status: DISCONTINUED | OUTPATIENT
Start: 2025-02-27 | End: 2025-02-28

## 2025-02-27 RX ORDER — SENNA 187 MG
2 TABLET ORAL AT BEDTIME
Refills: 0 | Status: DISCONTINUED | OUTPATIENT
Start: 2025-02-27 | End: 2025-02-28

## 2025-02-27 RX ORDER — MAGNESIUM SULFATE 500 MG/ML
1 SYRINGE (ML) INJECTION ONCE
Refills: 0 | Status: COMPLETED | OUTPATIENT
Start: 2025-02-27 | End: 2025-02-27

## 2025-02-27 RX ORDER — OXYCODONE HYDROCHLORIDE 30 MG/1
5 TABLET ORAL EVERY 4 HOURS
Refills: 0 | Status: DISCONTINUED | OUTPATIENT
Start: 2025-02-27 | End: 2025-02-28

## 2025-02-27 RX ORDER — CEFAZOLIN SODIUM IN 0.9 % NACL 3 G/100 ML
2000 INTRAVENOUS SOLUTION, PIGGYBACK (ML) INTRAVENOUS EVERY 8 HOURS
Refills: 0 | Status: COMPLETED | OUTPATIENT
Start: 2025-02-28 | End: 2025-02-28

## 2025-02-27 RX ORDER — ACETAMINOPHEN 500 MG/5ML
650 LIQUID (ML) ORAL EVERY 6 HOURS
Refills: 0 | Status: DISCONTINUED | OUTPATIENT
Start: 2025-02-27 | End: 2025-02-27

## 2025-02-27 RX ADMIN — Medication 2000 MILLIGRAM(S): at 14:38

## 2025-02-27 RX ADMIN — Medication 650 MILLIGRAM(S): at 18:35

## 2025-02-27 RX ADMIN — Medication 1 APPLICATION(S): at 09:25

## 2025-02-27 RX ADMIN — OXYCODONE HYDROCHLORIDE 10 MILLIGRAM(S): 30 TABLET ORAL at 03:00

## 2025-02-27 RX ADMIN — OXYCODONE HYDROCHLORIDE 10 MILLIGRAM(S): 30 TABLET ORAL at 23:00

## 2025-02-27 RX ADMIN — Medication 100 GRAM(S): at 06:03

## 2025-02-27 RX ADMIN — Medication 2000 MILLIGRAM(S): at 05:16

## 2025-02-27 RX ADMIN — OXYCODONE HYDROCHLORIDE 10 MILLIGRAM(S): 30 TABLET ORAL at 02:01

## 2025-02-27 RX ADMIN — Medication 1000 MILLILITER(S): at 00:30

## 2025-02-27 RX ADMIN — OXYCODONE HYDROCHLORIDE 10 MILLIGRAM(S): 30 TABLET ORAL at 15:13

## 2025-02-27 RX ADMIN — OXYCODONE HYDROCHLORIDE 10 MILLIGRAM(S): 30 TABLET ORAL at 16:00

## 2025-02-27 RX ADMIN — OXYCODONE HYDROCHLORIDE 10 MILLIGRAM(S): 30 TABLET ORAL at 22:10

## 2025-02-27 RX ADMIN — OXYCODONE HYDROCHLORIDE 10 MILLIGRAM(S): 30 TABLET ORAL at 11:00

## 2025-02-27 RX ADMIN — OXYCODONE HYDROCHLORIDE 10 MILLIGRAM(S): 30 TABLET ORAL at 08:53

## 2025-02-27 NOTE — BRIEF OPERATIVE NOTE - NSICDXBRIEFPROCEDURE_GEN_ALL_CORE_FT
PROCEDURES:  Simple removal, foreign body 27-Feb-2025 21:57:23  Jose Gaines  
PROCEDURES:  Cranioplasty, for skull defect 5 cm or less in diameter 26-Feb-2025 13:49:04 Craniectomy/cranioplasty for depressed skull/frontal sinus fx Chelsie Sharp
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
## 2025-02-27 NOTE — BRIEF OPERATIVE NOTE - NSICDXBRIEFPREOP_GEN_ALL_CORE_FT
PRE-OP DIAGNOSIS:  Frontal skull fracture 26-Feb-2025 13:51:07 depressed skull/frontal sinus fx Chelsie Sharp  
PRE-OP DIAGNOSIS:  Frontal skull fracture 26-Feb-2025 13:51:07 depressed skull/frontal sinus fx Chelsie Sharp

## 2025-02-27 NOTE — DISCHARGE NOTE PROVIDER - NSDCCPTREATMENT_GEN_ALL_CORE_FT
PRINCIPAL PROCEDURE  Procedure: Cranioplasty, for skull defect 5 cm or less in diameter  Findings and Treatment: Craniectomy/cranioplasty for depressed skull/frontal sinus fx      SECONDARY PROCEDURE  Procedure: Simple removal, foreign body  Findings and Treatment:

## 2025-02-27 NOTE — PROGRESS NOTE ADULT - SUBJECTIVE AND OBJECTIVE BOX
Patient is a 34y old  Male who presents with a chief complaint of Depressed frontal skull/sinus fracture (27 Feb 2025 10:52)      INTERVAL HPI/OVERNIGHT EVENTS:    Pt awake, alert and oriented.  in progress. No acute events overnight. Pt O2 sats % on RA. TMAX 37.3.  Pt denies CP,palpitations, SOB, dizziness,vision changes, NV. Pt endorses mild headache and incisional pain but states is generally well controlled with medication. Surgical incision dressing CDI. Team attempted to remove surgical drain but resistance met, surgeon notified and plan is to return to OR for exploration and removal. Pt is stable from a medicine perspective and medical team will con't to follow along with patients progress.       T(C): 36.9 (02-27-25 @ 11:02), Max: 37.3 (02-27-25 @ 00:00)  HR: 97 (02-27-25 @ 12:00) (58 - 107)  BP: 116/82 (02-27-25 @ 12:00) (102/60 - 133/89)  RR: 16 (02-27-25 @ 12:00) (11 - 17)  SpO2: 100% (02-27-25 @ 12:00) (98% - 100%)  Wt(kg): --Vital Signs Last 24 Hrs  T(C): 36.9 (27 Feb 2025 11:02), Max: 37.3 (27 Feb 2025 00:00)  T(F): 98.5 (27 Feb 2025 11:02), Max: 99.1 (27 Feb 2025 00:00)  HR: 97 (27 Feb 2025 12:00) (58 - 107)  BP: 116/82 (27 Feb 2025 12:00) (102/60 - 133/89)  BP(mean): 93 (27 Feb 2025 12:00) (71 - 102)  RR: 16 (27 Feb 2025 12:00) (11 - 17)  SpO2: 100% (27 Feb 2025 12:00) (98% - 100%)    Parameters below as of 27 Feb 2025 12:00  Patient On (Oxygen Delivery Method): room air        PHYSICAL EXAM:  GENERAL: NAD, well-groomed, well-developed  HEAD:  Surgical incision dressing present  EYES: EOMI, PERRLA,   ENMT: Moist mucous membranes  NECK:  No JVD, Normal thyroid  NERVOUS SYSTEM:  Alert & Oriented X3, Good concentration; Motor Strength 5/5 B/L upper and lower extremities; DTRs 2+ intact and symmetric  CHEST/LUNG: Clear to auscultation bilaterally; No rales, rhonchi, wheezing  HEART: Regular rate and rhythm; No murmurs, rub  ABDOMEN: Soft, Nontender, Nondistended; Bowel sounds present  EXTREMITIES:  2+ Peripheral Pulses, No clubbing, cyanosis, or edema  SKIN: surgical incision dressing CDI, drain in place; No rashes or lesions    Consultant(s) Notes Reviewed:  [x ] YES  [ ] NO  Care Discussed with Consultants/Other Providers [ x] YES  [ ] NO    LABS:          RADIOLOGY & ADDITIONAL TESTS:    Imaging Personally Reviewed:  [ ] YES  [ ] NO  acetaminophen     Tablet .. 650 milliGRAM(s) Oral every 6 hours PRN  ceFAZolin  Injectable. 2000 milliGRAM(s) IV Push every 8 hours  chlorhexidine 2% Cloths 1 Application(s) Topical <User Schedule>  hydrALAZINE Injectable 10 milliGRAM(s) IV Push every 2 hours PRN  influenza   Vaccine 0.5 milliLiter(s) IntraMuscular once  melatonin 3 milliGRAM(s) Oral at bedtime PRN  ondansetron Injectable 4 milliGRAM(s) IV Push every 6 hours PRN  oxyCODONE    IR 5 milliGRAM(s) Oral every 6 hours PRN  oxyCODONE    IR 10 milliGRAM(s) Oral every 6 hours PRN  polyethylene glycol 3350 17 Gram(s) Oral daily  senna 2 Tablet(s) Oral at bedtime  sodium chloride 0.9%. 1000 milliLiter(s) IV Continuous <Continuous>      HEALTH ISSUES - PROBLEM Dx:         Patient is a 34y old  Male who presents with a chief complaint of Depressed frontal skull/sinus fracture (27 Feb 2025 10:52)      INTERVAL HPI/OVERNIGHT EVENTS:    Pt awake, alert and oriented.  in progress. POD 01 s/p craniectomy/cranioplasty for depressed front skull/sinus fracture.  No acute events overnight. Pt O2 sats % on RA. TMAX 37.3.  Pt denies CP,palpitations, SOB, dizziness,vision changes, NV. Pt endorses mild headache and incisional pain but states is generally well controlled with medication. Surgical incision dressing CDI. Team attempted to remove surgical drain but resistance met, surgeon notified and plan is to return to OR for exploration and removal. Pt is stable from a medicine perspective and medical team will con't to follow along with patients progress.       T(C): 36.9 (02-27-25 @ 11:02), Max: 37.3 (02-27-25 @ 00:00)  HR: 97 (02-27-25 @ 12:00) (58 - 107)  BP: 116/82 (02-27-25 @ 12:00) (102/60 - 133/89)  RR: 16 (02-27-25 @ 12:00) (11 - 17)  SpO2: 100% (02-27-25 @ 12:00) (98% - 100%)  Wt(kg): --Vital Signs Last 24 Hrs  T(C): 36.9 (27 Feb 2025 11:02), Max: 37.3 (27 Feb 2025 00:00)  T(F): 98.5 (27 Feb 2025 11:02), Max: 99.1 (27 Feb 2025 00:00)  HR: 97 (27 Feb 2025 12:00) (58 - 107)  BP: 116/82 (27 Feb 2025 12:00) (102/60 - 133/89)  BP(mean): 93 (27 Feb 2025 12:00) (71 - 102)  RR: 16 (27 Feb 2025 12:00) (11 - 17)  SpO2: 100% (27 Feb 2025 12:00) (98% - 100%)    Parameters below as of 27 Feb 2025 12:00  Patient On (Oxygen Delivery Method): room air        PHYSICAL EXAM:  GENERAL: NAD, well-groomed, well-developed  HEAD:  Surgical incision dressing present  EYES: EOMI, PERRLA,   ENMT: Moist mucous membranes  NECK:  No JVD, Normal thyroid  NERVOUS SYSTEM:  Alert & Oriented X3, Good concentration; Motor Strength 5/5 B/L upper and lower extremities; DTRs 2+ intact and symmetric  CHEST/LUNG: Clear to auscultation bilaterally; No rales, rhonchi, wheezing  HEART: Regular rate and rhythm; No murmurs, rub  ABDOMEN: Soft, Nontender, Nondistended; Bowel sounds present  EXTREMITIES:  2+ Peripheral Pulses, No clubbing, cyanosis, or edema  SKIN: surgical incision dressing CDI, drain in place; No rashes or lesions    Consultant(s) Notes Reviewed:  [x ] YES  [ ] NO  Care Discussed with Consultants/Other Providers [ x] YES  [ ] NO    LABS:          RADIOLOGY & ADDITIONAL TESTS:    Imaging Personally Reviewed:  [ ] YES  [ ] NO  acetaminophen     Tablet .. 650 milliGRAM(s) Oral every 6 hours PRN  ceFAZolin  Injectable. 2000 milliGRAM(s) IV Push every 8 hours  chlorhexidine 2% Cloths 1 Application(s) Topical <User Schedule>  hydrALAZINE Injectable 10 milliGRAM(s) IV Push every 2 hours PRN  influenza   Vaccine 0.5 milliLiter(s) IntraMuscular once  melatonin 3 milliGRAM(s) Oral at bedtime PRN  ondansetron Injectable 4 milliGRAM(s) IV Push every 6 hours PRN  oxyCODONE    IR 5 milliGRAM(s) Oral every 6 hours PRN  oxyCODONE    IR 10 milliGRAM(s) Oral every 6 hours PRN  polyethylene glycol 3350 17 Gram(s) Oral daily  senna 2 Tablet(s) Oral at bedtime  sodium chloride 0.9%. 1000 milliLiter(s) IV Continuous <Continuous>      HEALTH ISSUES - PROBLEM Dx:         Patient is a 34y old  Male who presents with a chief complaint of Depressed frontal skull/sinus fracture (27 Feb 2025 10:52)      INTERVAL HPI/OVERNIGHT EVENTS:    POD 01 s/p craniectomy/cranioplasty for depressed front skull/sinus fracture    Pt awake, alert and oriented.  in progress. POD 01 s/p craniectomy/cranioplasty for depressed front skull/sinus fracture.  No acute events overnight. Pt O2 sats % on RA. TMAX 37.3.  Pt denies CP,palpitations, SOB, dizziness,vision changes, NV. Pt endorses mild headache and incisional pain but states is generally well controlled with medication. Surgical incision dressing CDI. Team attempted to remove surgical drain but resistance met, surgeon notified and plan is to return to OR for exploration and removal. Pt is stable from a medicine perspective and medical team will con't to follow along with patients progress.       T(C): 36.9 (02-27-25 @ 11:02), Max: 37.3 (02-27-25 @ 00:00)  HR: 97 (02-27-25 @ 12:00) (58 - 107)  BP: 116/82 (02-27-25 @ 12:00) (102/60 - 133/89)  RR: 16 (02-27-25 @ 12:00) (11 - 17)  SpO2: 100% (02-27-25 @ 12:00) (98% - 100%)  Wt(kg): --Vital Signs Last 24 Hrs  T(C): 36.9 (27 Feb 2025 11:02), Max: 37.3 (27 Feb 2025 00:00)  T(F): 98.5 (27 Feb 2025 11:02), Max: 99.1 (27 Feb 2025 00:00)  HR: 97 (27 Feb 2025 12:00) (58 - 107)  BP: 116/82 (27 Feb 2025 12:00) (102/60 - 133/89)  BP(mean): 93 (27 Feb 2025 12:00) (71 - 102)  RR: 16 (27 Feb 2025 12:00) (11 - 17)  SpO2: 100% (27 Feb 2025 12:00) (98% - 100%)    Parameters below as of 27 Feb 2025 12:00  Patient On (Oxygen Delivery Method): room air        PHYSICAL EXAM:  GENERAL: NAD, well-groomed, well-developed  HEAD:  Surgical incision dressing present  EYES: EOMI, PERRLA,   ENMT: Moist mucous membranes  NECK:  No JVD, Normal thyroid  NERVOUS SYSTEM:  Alert & Oriented X3, Good concentration; Motor Strength 5/5 B/L upper and lower extremities; DTRs 2+ intact and symmetric  CHEST/LUNG: Clear to auscultation bilaterally; No rales, rhonchi, wheezing  HEART: Regular rate and rhythm; No murmurs, rub  ABDOMEN: Soft, Nontender, Nondistended; Bowel sounds present  EXTREMITIES:  2+ Peripheral Pulses, No clubbing, cyanosis, or edema  SKIN: surgical incision dressing CDI, drain in place; No rashes or lesions    Consultant(s) Notes Reviewed:  [x ] YES  [ ] NO  Care Discussed with Consultants/Other Providers [ x] YES  [ ] NO    LABS:          RADIOLOGY & ADDITIONAL TESTS:    Imaging Personally Reviewed:  [ ] YES  [ ] NO  acetaminophen     Tablet .. 650 milliGRAM(s) Oral every 6 hours PRN  ceFAZolin  Injectable. 2000 milliGRAM(s) IV Push every 8 hours  chlorhexidine 2% Cloths 1 Application(s) Topical <User Schedule>  hydrALAZINE Injectable 10 milliGRAM(s) IV Push every 2 hours PRN  influenza   Vaccine 0.5 milliLiter(s) IntraMuscular once  melatonin 3 milliGRAM(s) Oral at bedtime PRN  ondansetron Injectable 4 milliGRAM(s) IV Push every 6 hours PRN  oxyCODONE    IR 5 milliGRAM(s) Oral every 6 hours PRN  oxyCODONE    IR 10 milliGRAM(s) Oral every 6 hours PRN  polyethylene glycol 3350 17 Gram(s) Oral daily  senna 2 Tablet(s) Oral at bedtime  sodium chloride 0.9%. 1000 milliLiter(s) IV Continuous <Continuous>      HEALTH ISSUES - PROBLEM Dx:         Patient is a 34y old  Male who presents with a chief complaint of Depressed frontal skull/sinus fracture (27 Feb 2025 10:52)      INTERVAL HPI/OVERNIGHT EVENTS:    POD 01 s/p craniectomy/cranioplasty for depressed front skull/sinus fracture    Pt awake, alert and oriented.  in progress. POD 01 s/p craniectomy/cranioplasty for depressed front skull/sinus fracture.  No acute events overnight. Pt O2 sats % on RA. TMAX 37.3.  Pt denies CP,palpitations, SOB, dizziness,vision changes, NV. Pt endorses mild headache and incisional pain but states is generally well controlled with medication. Surgical incision dressing CDI. Team attempted to remove surgical drain but resistance met, surgeon notified and plan is to return to OR for exploration and removal. Pt is stable from a medicine perspective and medical team will con't to follow along with patients progress.       T(C): 36.9 (02-27-25 @ 11:02), Max: 37.3 (02-27-25 @ 00:00)  HR: 97 (02-27-25 @ 12:00) (58 - 107)  BP: 116/82 (02-27-25 @ 12:00) (102/60 - 133/89)  RR: 16 (02-27-25 @ 12:00) (11 - 17)  SpO2: 100% (02-27-25 @ 12:00) (98% - 100%)  Wt(kg): --Vital Signs Last 24 Hrs  T(C): 36.9 (27 Feb 2025 11:02), Max: 37.3 (27 Feb 2025 00:00)  T(F): 98.5 (27 Feb 2025 11:02), Max: 99.1 (27 Feb 2025 00:00)  HR: 97 (27 Feb 2025 12:00) (58 - 107)  BP: 116/82 (27 Feb 2025 12:00) (102/60 - 133/89)  BP(mean): 93 (27 Feb 2025 12:00) (71 - 102)  RR: 16 (27 Feb 2025 12:00) (11 - 17)  SpO2: 100% (27 Feb 2025 12:00) (98% - 100%)    Parameters below as of 27 Feb 2025 12:00  Patient On (Oxygen Delivery Method): room air        PHYSICAL EXAM:  GENERAL: NAD, well-groomed, well-developed  HEAD:  Surgical incision dressing present  ENMT: Moist mucous membranes  NECK:  No JVD, Normal thyroid  NERVOUS SYSTEM:  Alert & Oriented X3  CHEST/LUNG: Clear to auscultation bilaterally; No rales, rhonchi, wheezing  HEART: Regular rate and rhythm; No murmurs, rub  ABDOMEN: Soft, Nontender, Nondistended; Bowel sounds present  EXTREMITIES:  1+ Peripheral Pulses, No edema  SKIN: surgical incision dressing CDI, drain in place; No rashes or lesions    Consultant(s) Notes Reviewed:  [x ] YES  [ ] NO  Care Discussed with Consultants/Other Providers [ x] YES  [ ] NO    LABS:          RADIOLOGY & ADDITIONAL TESTS:    Imaging Personally Reviewed:  [ ] YES  [ ] NO  acetaminophen     Tablet .. 650 milliGRAM(s) Oral every 6 hours PRN  ceFAZolin  Injectable. 2000 milliGRAM(s) IV Push every 8 hours  chlorhexidine 2% Cloths 1 Application(s) Topical <User Schedule>  hydrALAZINE Injectable 10 milliGRAM(s) IV Push every 2 hours PRN  influenza   Vaccine 0.5 milliLiter(s) IntraMuscular once  melatonin 3 milliGRAM(s) Oral at bedtime PRN  ondansetron Injectable 4 milliGRAM(s) IV Push every 6 hours PRN  oxyCODONE    IR 5 milliGRAM(s) Oral every 6 hours PRN  oxyCODONE    IR 10 milliGRAM(s) Oral every 6 hours PRN  polyethylene glycol 3350 17 Gram(s) Oral daily  senna 2 Tablet(s) Oral at bedtime  sodium chloride 0.9%. 1000 milliLiter(s) IV Continuous <Continuous>      HEALTH ISSUES - PROBLEM Dx:
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
## 2025-02-27 NOTE — PROGRESS NOTE ADULT - ATTENDING COMMENTS
Patient is see and evaluated, chart reviewed in detail, agree with assessment and plan of the NP student  some pain at the surgical site   scalp surgical wound with   CTA b/l   wound care   drain care   pain meds.

## 2025-02-27 NOTE — DISCHARGE NOTE PROVIDER - NSDCFUSCHEDAPPT_GEN_ALL_CORE_FT
[facility]  CATSCAN [address]  Scheduled Appointment: 03/04/2025    [facility]  MRI  [address]  Scheduled Appointment: 03/04/2025

## 2025-02-27 NOTE — DISCHARGE NOTE PROVIDER - CARE PROVIDER_API CALL
Jose Gaines  Neurosurgery  [address] [place] [id number]  Phone: [phone]  Fax: [phone]  Follow Up Time: 2 weeks   Body Location Override (Optional - Billing Will Still Be Based On Selected Body Map Location If Applicable): left upper back Detail Level: Detailed Depth Of Biopsy: dermis Was A Bandage Applied: Yes Size Of Lesion In Cm: 0.6 X Size Of Lesion In Cm: 0 Biopsy Type: H and E Biopsy Method: Dermablade Anesthesia Type: bacteriostatic saline Anesthesia Volume In Cc (Will Not Render If 0): 0.5 Hemostasis: Drysol Wound Care: Petrolatum Dressing: bandage Destruction After The Procedure: No Type Of Destruction Used: Curettage Curettage Text: The wound bed was treated with curettage after the biopsy was performed. Cryotherapy Text: The wound bed was treated with cryotherapy after the biopsy was performed. Electrodesiccation Text: The wound bed was treated with electrodesiccation after the biopsy was performed. Electrodesiccation And Curettage Text: The wound bed was treated with electrodesiccation and curettage after the biopsy was performed. Silver Nitrate Text: The wound bed was treated with silver nitrate after the biopsy was performed. Lab: [address] [place] [address] Yong Consent: Written consent was obtained and risks were reviewed including but not limited to scarring, infection, bleeding, scabbing, incomplete removal, nerve damage and allergy to anesthesia. Post-Care Instructions: I reviewed with the patient in detail post-care instructions. Patient is to keep the biopsy site dry overnight, and then apply bacitracin twice daily until healed. Patient may apply hydrogen peroxide soaks to remove any crusting.  Written instructions provided Notification Instructions: Patient will be notified of biopsy results. However, patient instructed to call the office if not contacted within 2 weeks. Billing Type: [employer] Information: Selecting Yes will display possible errors in your note based on the variables you have selected. This validation is only offered as a suggestion for you. PLEASE NOTE THAT THE VALIDATION TEXT WILL BE REMOVED WHEN YOU FINALIZE YOUR NOTE. IF YOU WANT TO FAX A PRELIMINARY NOTE YOU WILL NEED TO TOGGLE THIS TO 'NO' IF YOU DO NOT WANT IT IN YOUR FAXED NOTE. Body Location Override (Optional - Billing Will Still Be Based On Selected Body Map Location If Applicable): right posterior upper arm Lab: [facility] Lab Facility: [address] [facility] Instructions: I reviewed with the patient in detail post-care instructions. Patient is to keep the biopsy site dry overnight, and then apply bacitracin twice daily until healed. Patient may apply hydrogen peroxide soaks to remove any crusting. Written instructions provided Billing Type: Third-Party Bill

## 2025-02-27 NOTE — DISCHARGE NOTE PROVIDER - NSDCCPCAREPLAN_GEN_ALL_CORE_FT
PRINCIPAL DISCHARGE DIAGNOSIS  Diagnosis: Other closed fracture of skull  Assessment and Plan of Treatment:

## 2025-02-27 NOTE — DISCHARGE NOTE PROVIDER - NSDCFUADDINST_GEN_ALL_CORE_FT
Diet: You should continue a Regular diet. Ensure a well balanced and proper diet to help with proper wound healing. It is imperative for adequate water intake, dietary fiber intake, and ambulation as tolerated to avoid constipation.    Pain: It is common to have a headache or incisional pain after surgery. You may take the pain medication we prescribe, or over the counter Tylenol. Pain medication, especially narcotics, can cause constipation. Use stool softeners or mild laxative as needed.     Activity: Avoid straining, heavy lifting (objects greater than 10 pounds), strenuous activity, twisting, bending, and vigorous exercise. You should not drive or operate machinery until cleared by your neurosurgeon.     Wound: Monitor your incision for drainage, redness, swelling. Call our office if you have any concerns. You may remove the dressing 3 days after surgery (3/1/2025) . You may shower the incision site 5 days after surgery (3/3/2025). While washing, do not rub, scratch, or scrub your incision. You may wash your incision with mild shampoo/soap. Keep the incision open to air. However, if you want to cover the incision, you may, with a clean scarf or hat.     Appointment(s): Follow up with Dr. Gaines in our office outpatient in 1-2 weeks. Call [phone] to schedule an appointment. Sutures/staples will be removed at follow up appointment. It is also important to see your primary physician to keep them up to date regarding your recent admission and for a formal outpatient comprehensive medical review. Call their offices for an appointment as soon as you leave the hospital. If you do not have a primary physician, you may contact the [facility] [place] [phone] located on [address] [place] [address].    Should you develop any new or worsening neurologic symptoms or have concern about your incision, please call our office immediately or visit the emergency department.    Return to ER immediately for any of the following: fever, bleeding, new onset numbness/tingling/weakness, nausea and/or vomiting, chest pain, shortness of breath, confusion, seizure, altered mental status, urinary and/or fecal incontinence or retention.
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
## 2025-02-27 NOTE — BRIEF OPERATIVE NOTE - OPERATION/FINDINGS
CARLOS drain with significant resistance on attempted bedside removal; drain removed in total without disruption of subgaleal segment; no obvious breach of drain with suture material and no apparent tethering to cranioplasty plate
Craniectomy/cranioplasty for depressed skull/frontal sinus fx

## 2025-02-27 NOTE — PROVIDER CONTACT NOTE (OTHER) - SITUATION
Patient's heart rate has been ranging from  post pain medication administration. Patient currently sleeping. /75. stating 99% on room air.
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
## 2025-02-27 NOTE — BRIEF OPERATIVE NOTE - FIRST ASSIST NAME
Chelsie Sharp (Physician Assistant)
Laura Sanchez (Physician Assistant)
Laura Sanchez (Physician Assistant)
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
## 2025-02-28 ENCOUNTER — TRANSCRIPTION ENCOUNTER (OUTPATIENT)
Age: 35
End: 2025-02-28

## 2025-02-28 ENCOUNTER — RESULT REVIEW (OUTPATIENT)
Age: 35
End: 2025-02-28

## 2025-02-28 VITALS — OXYGEN SATURATION: 97 % | HEART RATE: 100 BPM

## 2025-02-28 LAB
ANION GAP SERPL CALC-SCNC: 10 MMOL/L — SIGNIFICANT CHANGE UP (ref 5–17)
BUN SERPL-MCNC: 7 MG/DL — LOW (ref 8–20)
CALCIUM SERPL-MCNC: 8.3 MG/DL — LOW (ref 8.4–10.5)
CHLORIDE SERPL-SCNC: 102 MMOL/L — SIGNIFICANT CHANGE UP (ref 96–108)
CO2 SERPL-SCNC: 26 MMOL/L — SIGNIFICANT CHANGE UP (ref 22–29)
CREAT SERPL-MCNC: 1.07 MG/DL — SIGNIFICANT CHANGE UP (ref 0.5–1.3)
EGFR: 94 ML/MIN/1.73M2 — SIGNIFICANT CHANGE UP
GLUCOSE SERPL-MCNC: 109 MG/DL — HIGH (ref 70–99)
HCT VFR BLD CALC: 34.1 % — LOW (ref 39–50)
HGB BLD-MCNC: 11.3 G/DL — LOW (ref 13–17)
MAGNESIUM SERPL-MCNC: 1.9 MG/DL — SIGNIFICANT CHANGE UP (ref 1.6–2.6)
MCHC RBC-ENTMCNC: 29.9 PG — SIGNIFICANT CHANGE UP (ref 27–34)
MCHC RBC-ENTMCNC: 33.1 G/DL — SIGNIFICANT CHANGE UP (ref 32–36)
MCV RBC AUTO: 90.2 FL — SIGNIFICANT CHANGE UP (ref 80–100)
NRBC # BLD AUTO: 0 K/UL — SIGNIFICANT CHANGE UP (ref 0–0)
NRBC # FLD: 0 K/UL — SIGNIFICANT CHANGE UP (ref 0–0)
NRBC BLD AUTO-RTO: 0 /100 WBCS — SIGNIFICANT CHANGE UP (ref 0–0)
PHOSPHATE SERPL-MCNC: 2.6 MG/DL — SIGNIFICANT CHANGE UP (ref 2.4–4.7)
PLATELET # BLD AUTO: 206 K/UL — SIGNIFICANT CHANGE UP (ref 150–400)
PMV BLD: 10.3 FL — SIGNIFICANT CHANGE UP (ref 7–13)
POTASSIUM SERPL-MCNC: 4.3 MMOL/L — SIGNIFICANT CHANGE UP (ref 3.5–5.3)
POTASSIUM SERPL-SCNC: 4.3 MMOL/L — SIGNIFICANT CHANGE UP (ref 3.5–5.3)
RBC # BLD: 3.78 M/UL — LOW (ref 4.2–5.8)
RBC # FLD: 11.8 % — SIGNIFICANT CHANGE UP (ref 10.3–14.5)
SODIUM SERPL-SCNC: 138 MMOL/L — SIGNIFICANT CHANGE UP (ref 135–145)
SURGICAL PATHOLOGY STUDY: SIGNIFICANT CHANGE UP
WBC # BLD: 8.77 K/UL — SIGNIFICANT CHANGE UP (ref 3.8–10.5)
WBC # FLD AUTO: 8.77 K/UL — SIGNIFICANT CHANGE UP (ref 3.8–10.5)

## 2025-02-28 PROCEDURE — 84100 ASSAY OF PHOSPHORUS: CPT

## 2025-02-28 PROCEDURE — 80048 BASIC METABOLIC PNL TOTAL CA: CPT

## 2025-02-28 PROCEDURE — 71045 X-RAY EXAM CHEST 1 VIEW: CPT

## 2025-02-28 PROCEDURE — 93970 EXTREMITY STUDY: CPT

## 2025-02-28 PROCEDURE — 36415 COLL VENOUS BLD VENIPUNCTURE: CPT

## 2025-02-28 PROCEDURE — 87640 STAPH A DNA AMP PROBE: CPT

## 2025-02-28 PROCEDURE — 97163 PT EVAL HIGH COMPLEX 45 MIN: CPT

## 2025-02-28 PROCEDURE — 86900 BLOOD TYPING SEROLOGIC ABO: CPT

## 2025-02-28 PROCEDURE — 97167 OT EVAL HIGH COMPLEX 60 MIN: CPT

## 2025-02-28 PROCEDURE — 88300 SURGICAL PATH GROSS: CPT | Mod: 26

## 2025-02-28 PROCEDURE — 99232 SBSQ HOSP IP/OBS MODERATE 35: CPT

## 2025-02-28 PROCEDURE — 70450 CT HEAD/BRAIN W/O DYE: CPT | Mod: 26

## 2025-02-28 PROCEDURE — C9399: CPT

## 2025-02-28 PROCEDURE — 85610 PROTHROMBIN TIME: CPT

## 2025-02-28 PROCEDURE — 85730 THROMBOPLASTIN TIME PARTIAL: CPT

## 2025-02-28 PROCEDURE — 87641 MR-STAPH DNA AMP PROBE: CPT

## 2025-02-28 PROCEDURE — 84443 ASSAY THYROID STIM HORMONE: CPT

## 2025-02-28 PROCEDURE — 83735 ASSAY OF MAGNESIUM: CPT

## 2025-02-28 PROCEDURE — 83036 HEMOGLOBIN GLYCOSYLATED A1C: CPT

## 2025-02-28 PROCEDURE — 85025 COMPLETE CBC W/AUTO DIFF WBC: CPT

## 2025-02-28 PROCEDURE — 86850 RBC ANTIBODY SCREEN: CPT

## 2025-02-28 PROCEDURE — C1889: CPT

## 2025-02-28 PROCEDURE — 85027 COMPLETE CBC AUTOMATED: CPT

## 2025-02-28 PROCEDURE — 80053 COMPREHEN METABOLIC PANEL: CPT

## 2025-02-28 PROCEDURE — 88300 SURGICAL PATH GROSS: CPT

## 2025-02-28 PROCEDURE — 93005 ELECTROCARDIOGRAM TRACING: CPT

## 2025-02-28 PROCEDURE — 70450 CT HEAD/BRAIN W/O DYE: CPT | Mod: MC

## 2025-02-28 PROCEDURE — 86901 BLOOD TYPING SEROLOGIC RH(D): CPT

## 2025-02-28 PROCEDURE — C1713: CPT

## 2025-02-28 RX ORDER — OXYCODONE HYDROCHLORIDE 30 MG/1
1 TABLET ORAL
Qty: 20 | Refills: 0
Start: 2025-02-28 | End: 2025-03-04

## 2025-02-28 RX ORDER — ACETAMINOPHEN 500 MG/5ML
2 LIQUID (ML) ORAL
Qty: 0 | Refills: 0 | DISCHARGE
Start: 2025-02-28

## 2025-02-28 RX ADMIN — OXYCODONE HYDROCHLORIDE 10 MILLIGRAM(S): 30 TABLET ORAL at 07:17

## 2025-02-28 RX ADMIN — OXYCODONE HYDROCHLORIDE 10 MILLIGRAM(S): 30 TABLET ORAL at 03:00

## 2025-02-28 RX ADMIN — Medication 2000 MILLIGRAM(S): at 13:05

## 2025-02-28 RX ADMIN — OXYCODONE HYDROCHLORIDE 10 MILLIGRAM(S): 30 TABLET ORAL at 02:10

## 2025-02-28 RX ADMIN — OXYCODONE HYDROCHLORIDE 10 MILLIGRAM(S): 30 TABLET ORAL at 07:37

## 2025-02-28 RX ADMIN — Medication 2000 MILLIGRAM(S): at 05:13

## 2025-02-28 RX ADMIN — Medication 650 MILLIGRAM(S): at 14:13

## 2025-02-28 RX ADMIN — OXYCODONE HYDROCHLORIDE 10 MILLIGRAM(S): 30 TABLET ORAL at 11:47

## 2025-02-28 RX ADMIN — Medication 40 MILLIGRAM(S): at 07:17

## 2025-02-28 RX ADMIN — OXYCODONE HYDROCHLORIDE 10 MILLIGRAM(S): 30 TABLET ORAL at 11:16

## 2025-02-28 NOTE — PHYSICAL THERAPY INITIAL EVALUATION ADULT - CRITERIA FOR SKILLED THERAPEUTIC INTERVENTIONS
Home/impairments found/anticipated equipment needs at discharge/anticipated discharge recommendation
Home, least restrictive device/impairments found/anticipated equipment needs at discharge/anticipated discharge recommendation

## 2025-02-28 NOTE — OCCUPATIONAL THERAPY INITIAL EVALUATION ADULT - ADDITIONAL COMMENTS
Pt has a shower with doors and no grab bars. Pt owns no DME. Pt is R handed and drives.
Pt has shower stall with doors  Pt does not own any DME  Pt is right handed
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
## 2025-02-28 NOTE — CHART NOTE - NSCHARTNOTEFT_GEN_A_CORE
Nutrition Note: Aware of plan for pt to be downgraded from ICU. Chart reviewed; RD to follow up with full nutrition assessment per medical floor protocol.
Dressing removed, site and incision cleaned with chlorhexidine. Attempted to pull drain but there was resistance so drain re-secured and dressed. Notified surgeon who came to bedside and also was unable to pull drain due to resistance. Plan for return to OR for exploration of drain and removal.
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
## 2025-02-28 NOTE — OCCUPATIONAL THERAPY INITIAL EVALUATION ADULT - GENERAL OBSERVATIONS, REHAB EVAL
+IV, +cardiac monitor, +VCBs
Pt is Haitian speaking. Use of ipad  for OT evaluation.  name: Nader  ID#: [id number]. Pt received supine in bed, NAD, +IV, +Tele//BP, +CARLOS, +b/l VCB on RA A&Ox4. Pt c/o 6/10 pre and post head pain, pain meds on board per RN, pt states without medicine pain can be 10/10. Pt agreeable to OT evaluation
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
## 2025-02-28 NOTE — PROGRESS NOTE ADULT - NS ATTEND AMEND GEN_ALL_CORE FT
NSGY Attg:    see above    patient seen and examined    agree with above    post-op CT reviewed    difficulty with resistance when removing drain  site inspected  plan to return to OR with sterile field for drain removal  risks of bleeding, infection and poor wound healing re-explained  patient in agreement with plan for return to OR for drain removal today
see my progress note
NSGY Attg:    see above    patient seen and examined    agree with above    post op CT reviewed    ok to downgrade from ICU  dc planning

## 2025-02-28 NOTE — PROGRESS NOTE ADULT - TIME BILLING
review of relevant history, clinical examination, review of data and images, discussion of treatment with the multidisciplinary team and any consultants involved in this patient’s care as well as family discussion. Questions answered and postop care and plan explained with the help of Vietnamese Tele-.

## 2025-02-28 NOTE — PROGRESS NOTE ADULT - PROVIDER SPECIALTY LIST ADULT
NSICU
Neurosurgery
Neurosurgery
Hospitalist
Neurosurgery
Hospitalist
Hospitalist
NSICU
Neurosurgery
Neurosurgery

## 2025-02-28 NOTE — PHYSICAL THERAPY INITIAL EVALUATION ADULT - SENSORY TESTS
(+) eye tracking bilaterally in all directions; (+) acuity in all fields; (+) finger to thumb coordination BUEs intact
(+) eye tracking bilaterally in all directions; (+) acuity in all fields; (+) finger to thumb coordination BUEs intact

## 2025-02-28 NOTE — OCCUPATIONAL THERAPY INITIAL EVALUATION ADULT - VISUAL ACUITY
No complaints in vision offered. Central and peripheral fields intact bilaterally
no vision c/o offered

## 2025-02-28 NOTE — OCCUPATIONAL THERAPY INITIAL EVALUATION ADULT - LIVES WITH, PROFILE
in a private house with 15 steps to enter with railing and no steps inside/significant other
Pt lives in a house with his girlfriend who can assist but who works. 15 RENE with handrail, no steps inside to negotiate./significant other

## 2025-02-28 NOTE — PROGRESS NOTE ADULT - NS ATTEND OPT1 GEN_ALL_CORE
I independently performed the documented:
,DirectAddress_Unknown,[email or web address]
I independently performed the documented:
I independently performed the documented:
,DirectAddress_Unknown,[email or web address]
,DirectAddress_Unknown,[email or web address]

## 2025-02-28 NOTE — PROGRESS NOTE ADULT - SUBJECTIVE AND OBJECTIVE BOX
TAMMY SALDAÑA    [id number]    34y      Male    Patient is a 34y old  Male who presents with a chief complaint of Depressed frontal skull/sinus fracture (28 Feb 2025 00:07)      INTERVAL HPI/OVERNIGHT EVENTS:    patient is doing ok, denies fever, chills, chest pain, sob, dizziness       Vital Signs Last 24 Hrs  T(C): 37.5 (28 Feb 2025 07:30), Max: 37.5 (28 Feb 2025 07:30)  T(F): 99.5 (28 Feb 2025 07:30), Max: 99.5 (28 Feb 2025 07:30)  HR: 81 (28 Feb 2025 10:00) (75 - 97)  BP: 112/76 (28 Feb 2025 10:00) (97/61 - 131/81)  BP(mean): 86 (28 Feb 2025 10:00) (69 - 94)  RR: 12 (28 Feb 2025 07:00) (11 - 18)  SpO2: 100% (28 Feb 2025 10:00) (96% - 100%)    Parameters below as of 28 Feb 2025 10:00  Patient On (Oxygen Delivery Method): room air        PHYSICAL EXAM:    GENERAL: Elderly male looking comfortable   HEENT: Scalp with clean dressings on   NECK: soft, Supple, No JVD  CHEST/LUNG: Decrease air entry bilaterally; No wheezing  HEART: S1S2+, Regular rate and rhythm; No murmurs  ABDOMEN: Soft, Nontender, Nondistended; Bowel sounds present  EXTREMITIES:  1+ Peripheral Pulses, No edema  SKIN: No rashes or lesions  NEURO: AAOX3  PSYCH: normal mood      LABS:                        11.3   8.77  )-----------( 206      ( 28 Feb 2025 02:15 )             34.1     02-28    138  |  102  |  7.0[L]  ----------------------------<  109[H]  4.3   |  26.0  |  1.07    Ca    8.3[L]      28 Feb 2025 02:15  Phos  2.6     02-28  Mg     1.9     02-28        I&O's Summary    27 Feb 2025 07:01  -  28 Feb 2025 07:00  --------------------------------------------------------  IN: 340 mL / OUT: 1925 mL / NET: -1585 mL    28 Feb 2025 07:01  -  28 Feb 2025 10:44  --------------------------------------------------------  IN: 100 mL / OUT: 300 mL / NET: -200 mL        MEDICATIONS  (STANDING):  ceFAZolin  Injectable. 2000 milliGRAM(s) IV Push every 8 hours  chlorhexidine 2% Cloths 1 Application(s) Topical <User Schedule>  influenza   Vaccine 0.5 milliLiter(s) IntraMuscular once  pantoprazole    Tablet 40 milliGRAM(s) Oral before breakfast  polyethylene glycol 3350 17 Gram(s) Oral daily  senna 2 Tablet(s) Oral at bedtime    MEDICATIONS  (PRN):  acetaminophen     Tablet .. 650 milliGRAM(s) Oral every 6 hours PRN Mild Pain (1 - 3)  hydrALAZINE Injectable 10 milliGRAM(s) IV Push every 2 hours PRN SBP > 160mm Hg  HYDROmorphone  Injectable 0.5 milliGRAM(s) IV Push every 6 hours PRN Severe Pain (7 - 10)  labetalol Injectable 10 milliGRAM(s) IV Push every 2 hours PRN Systolic blood pressure >160  melatonin 3 milliGRAM(s) Oral at bedtime PRN Insomnia  ondansetron Injectable 4 milliGRAM(s) IV Push every 6 hours PRN Nausea and/or Vomiting  oxyCODONE    IR 5 milliGRAM(s) Oral every 4 hours PRN Moderate Pain (4 - 6)  oxyCODONE    IR 10 milliGRAM(s) Oral every 4 hours PRN Severe Pain (7 - 10)

## 2025-02-28 NOTE — DISCHARGE NOTE NURSING/CASE MANAGEMENT/SOCIAL WORK - PATIENT PORTAL LINK FT
You can access the FollowMyHealth Patient Portal offered by [facility] by registering at the following website: [email or web address]. By joining [employer]’s FollowMyHealth portal, you will also be able to view your health information using other applications (apps) compatible with our system.

## 2025-02-28 NOTE — PHYSICAL THERAPY INITIAL EVALUATION ADULT - DIAGNOSIS, PT EVAL
Pt at baseline functional status
functional debility 2*2 decreased strength and impaired balance s/p cranioplasty
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
## 2025-02-28 NOTE — PHYSICAL THERAPY INITIAL EVALUATION ADULT - ADDITIONAL COMMENTS
Pt lives in a private home with his girlfriend. 15 steps to enter with handrails, no steps inside. Pt was independent PTA without an assist device. Pt owns no DME.
Pt lives in a private home with his girlfriend. 15 steps to enter with handrails, no steps inside. Pt was independent PTA without an assist device. Pt owns no DME.

## 2025-02-28 NOTE — PROGRESS NOTE ADULT - REASON FOR ADMISSION
Depressed frontal skull/sinus fracture

## 2025-02-28 NOTE — PHYSICAL THERAPY INITIAL EVALUATION ADULT - GENERAL OBSERVATIONS, REHAB EVAL
Pt received supine in bed + telemetry//BP + IV + Venous Compression Boots + CARLOS drain. Pt c/o 0/10 pain, pt agreeable to PT
Pt received supine in bed + telemetry//BP + Venous Compression Boots. Pt c/o 0/10 pain, pt agreeable to PT

## 2025-03-03 NOTE — ED STATDOCS - CHPI ED RADIATION
Left message for patient to call back and confirm if [facility] or if he will be switching care to [facility]. Office number left in message for patient.    none

## 2025-03-04 ENCOUNTER — APPOINTMENT (OUTPATIENT)
Dept: MRI IMAGING | Facility: CLINIC | Age: 35
End: 2025-03-04
Payer: MEDICAID

## 2025-03-04 ENCOUNTER — APPOINTMENT (OUTPATIENT)
Dept: CT IMAGING | Facility: CLINIC | Age: 35
End: 2025-03-04
Payer: MEDICAID

## 2025-03-04 ENCOUNTER — OUTPATIENT (OUTPATIENT)
Dept: OUTPATIENT SERVICES | Facility: HOSPITAL | Age: 35
LOS: 1 days | End: 2025-03-04
Payer: MEDICAID

## 2025-03-04 ENCOUNTER — NON-APPOINTMENT (OUTPATIENT)
Age: 35
End: 2025-03-04

## 2025-03-04 DIAGNOSIS — S02.91XA UNSPECIFIED FRACTURE OF SKULL, INITIAL ENCOUNTER FOR CLOSED FRACTURE: ICD-10-CM

## 2025-03-04 DIAGNOSIS — Z98.890 OTHER SPECIFIED POSTPROCEDURAL STATES: Chronic | ICD-10-CM

## 2025-03-04 PROCEDURE — 70553 MRI BRAIN STEM W/O & W/DYE: CPT | Mod: 26

## 2025-03-04 PROCEDURE — 70450 CT HEAD/BRAIN W/O DYE: CPT | Mod: 26

## 2025-03-04 PROCEDURE — A9585: CPT

## 2025-03-04 PROCEDURE — 70450 CT HEAD/BRAIN W/O DYE: CPT

## 2025-03-04 PROCEDURE — 70553 MRI BRAIN STEM W/O & W/DYE: CPT

## 2025-03-11 ENCOUNTER — APPOINTMENT (OUTPATIENT)
Dept: NEUROSURGERY | Facility: CLINIC | Age: 35
End: 2025-03-11
Payer: MEDICAID

## 2025-03-11 VITALS
TEMPERATURE: 98.4 F | BODY MASS INDEX: 27.42 KG/M2 | SYSTOLIC BLOOD PRESSURE: 106 MMHG | DIASTOLIC BLOOD PRESSURE: 72 MMHG | WEIGHT: 170.6 LBS | HEART RATE: 75 BPM | OXYGEN SATURATION: 100 % | HEIGHT: 66 IN

## 2025-03-11 PROCEDURE — 99024 POSTOP FOLLOW-UP VISIT: CPT

## 2025-03-18 ENCOUNTER — APPOINTMENT (OUTPATIENT)
Dept: NEUROSURGERY | Facility: CLINIC | Age: 35
End: 2025-03-18
Payer: MEDICAID

## 2025-03-18 VITALS
OXYGEN SATURATION: 100 % | TEMPERATURE: 97.9 F | SYSTOLIC BLOOD PRESSURE: 124 MMHG | BODY MASS INDEX: 27.8 KG/M2 | HEIGHT: 66 IN | HEART RATE: 93 BPM | DIASTOLIC BLOOD PRESSURE: 78 MMHG | WEIGHT: 173 LBS

## 2025-03-18 PROCEDURE — 99024 POSTOP FOLLOW-UP VISIT: CPT

## 2025-03-25 ENCOUNTER — APPOINTMENT (OUTPATIENT)
Dept: NEUROSURGERY | Facility: CLINIC | Age: 35
End: 2025-03-25
Payer: MEDICAID

## 2025-03-25 VITALS
DIASTOLIC BLOOD PRESSURE: 78 MMHG | WEIGHT: 173 LBS | SYSTOLIC BLOOD PRESSURE: 120 MMHG | HEIGHT: 66 IN | BODY MASS INDEX: 27.8 KG/M2

## 2025-03-25 DIAGNOSIS — S02.91XA UNSPECIFIED FRACTURE OF SKULL, INITIAL ENCOUNTER FOR CLOSED FRACTURE: ICD-10-CM

## 2025-03-25 PROCEDURE — 99024 POSTOP FOLLOW-UP VISIT: CPT

## 2025-04-28 ENCOUNTER — APPOINTMENT (OUTPATIENT)
Dept: NEUROSURGERY | Facility: CLINIC | Age: 35
End: 2025-04-28
Payer: MEDICAID

## 2025-04-28 VITALS
BODY MASS INDEX: 28.7 KG/M2 | HEIGHT: 66 IN | HEART RATE: 70 BPM | OXYGEN SATURATION: 98 % | TEMPERATURE: 98.2 F | DIASTOLIC BLOOD PRESSURE: 71 MMHG | WEIGHT: 178.6 LBS | SYSTOLIC BLOOD PRESSURE: 119 MMHG

## 2025-04-28 DIAGNOSIS — S02.91XA UNSPECIFIED FRACTURE OF SKULL, INITIAL ENCOUNTER FOR CLOSED FRACTURE: ICD-10-CM

## 2025-04-28 PROCEDURE — 99024 POSTOP FOLLOW-UP VISIT: CPT

## 2025-08-11 ENCOUNTER — APPOINTMENT (OUTPATIENT)
Dept: NEUROSURGERY | Facility: CLINIC | Age: 35
End: 2025-08-11
Payer: MEDICAID

## 2025-08-11 DIAGNOSIS — S02.91XA UNSPECIFIED FRACTURE OF SKULL, INITIAL ENCOUNTER FOR CLOSED FRACTURE: ICD-10-CM
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
## 2025-08-11 PROCEDURE — 99214 OFFICE O/P EST MOD 30 MIN: CPT

## (undated) DEVICE — ELCTR 4-DISC 20MM 49" (RED, BLUE, GREEN, BLACK)

## (undated) DEVICE — ELCTR GROUNDING PAD ADULT COVIDIEN

## (undated) DEVICE — SUT MONOCRYL 3-0 27" PS-2 UNDYED

## (undated) DEVICE — DRSG XEROFORM 5 X 9"

## (undated) DEVICE — PACK NEURO

## (undated) DEVICE — Device

## (undated) DEVICE — ELCTR SUBDERMAL NDL 27G X 1/2" WITH TWISTED PAIR

## (undated) DEVICE — SOL IRR POUR H2O 1000ML

## (undated) DEVICE — WARMING BLANKET LOWER ADULT

## (undated) DEVICE — TAPE SILK 3"

## (undated) DEVICE — GLV 7 PROTEXIS (WHITE)

## (undated) DEVICE — ELCTR SUBDERMAL NDL CLASSIC 1.5M X 59" (6 COLOR)

## (undated) DEVICE — ELCTR MONOPOLAR STIMULATOR PROBE FLUSH-TIP

## (undated) DEVICE — SOL IRR POUR NS 0.9% 1000ML

## (undated) DEVICE — STAPLER SKIN PROXIMATE

## (undated) DEVICE — MEDICATION LABELS W MARKER

## (undated) DEVICE — SUT VICRYL PLUS 2-0 18" CP-2 UNDYED (POP-OFF)

## (undated) DEVICE — SUT ETHILON 3-0 18" PS-1

## (undated) DEVICE — TUBING FOR SMOKE EVACUATOR (PURPLE END)

## (undated) DEVICE — SUT VICRYL 2-0 18" CT-1 UNDYED (POP-OFF)

## (undated) DEVICE — DRAPE TOWEL BLUE 17" X 24"

## (undated) DEVICE — DRAPE INSTRUMENT POUCH 6.75" X 11"

## (undated) DEVICE — ELCTR PEDICLE SCREW PROBE 3MM BALL 1.8MM X 100MM

## (undated) DEVICE — POSITIONER FOAM LAMINECTOMY ARM CRADLE (PINK)

## (undated) DEVICE — DRSG TELFA 4 X 8

## (undated) DEVICE — BIPOLAR FORCEP SYMMETRY BAYONET 7" X 1.5MM SMOOTH (SILVER)

## (undated) DEVICE — SYR CONTROL LUER LOK 10CC

## (undated) DEVICE — SUT NUROLON 4-0 8-18" TF (POP-OFF)

## (undated) DEVICE — SYR LUER LOK 20CC

## (undated) DEVICE — ELCTR BOVIE TIP BLADE INSULATED 2.75" EDGE WITH SAFETY

## (undated) DEVICE — DRAPE 3/4 SHEET 52X76"

## (undated) DEVICE — MARKING PEN W RULER

## (undated) DEVICE — DRAPE THYROID 77" X 123"

## (undated) DEVICE — TUBING CONNECTING 6MM 20FT

## (undated) DEVICE — DRAPE CRANIOTOMY W POUCH 100X104"

## (undated) DEVICE — DRAPE 1/2 SHEET 40X57"

## (undated) DEVICE — ELCTR BOVIE TIP BLADE INSULATED 4" EDGE

## (undated) DEVICE — DRSG KERLIX ROLL LG 4.5"

## (undated) DEVICE — ELCTR BIPOLAR PROBE

## (undated) DEVICE — POSITIONER FOAM EGG CRATE ULNAR 2PCS (PINK)

## (undated) DEVICE — DRAPE XL SHEET 77X98"

## (undated) DEVICE — CATH IV SAFE BC 18G X 1.16" (GREEN)

## (undated) DEVICE — CLIPPER BLADE NEURO (BLUE)

## (undated) DEVICE — VENODYNE/SCD SLEEVE CALF MEDIUM

## (undated) DEVICE — GLV 7.5 PROTEXIS (WHITE)

## (undated) DEVICE — FRAZIER SUCTION TIP 10FR

## (undated) DEVICE — DRSG 4 X 8"

## (undated) DEVICE — SUT ETHILON 4-0 18" PS-2

## (undated) DEVICE — SUT VICRYL 2-0 27" SH UNDYED

## (undated) DEVICE — PREP DURAPREP 26CC

## (undated) DEVICE — ELCTR ROCKER SWITCH PENCIL BLUE 10FT

## (undated) DEVICE — DRAIN JACKSON PRATT 7MM FLAT FULL W 15 FR TROCAR

## (undated) DEVICE — FOLEY TRAY 16FR 5CC LTX UMETER CLOSED